# Patient Record
Sex: MALE | Race: WHITE | NOT HISPANIC OR LATINO | Employment: OTHER | URBAN - METROPOLITAN AREA
[De-identification: names, ages, dates, MRNs, and addresses within clinical notes are randomized per-mention and may not be internally consistent; named-entity substitution may affect disease eponyms.]

---

## 2017-01-06 ENCOUNTER — TELEPHONE (OUTPATIENT)
Dept: RHEUMATOLOGY | Facility: CLINIC | Age: 32
End: 2017-01-06

## 2017-01-09 ENCOUNTER — TELEPHONE (OUTPATIENT)
Dept: PAIN MEDICINE | Facility: CLINIC | Age: 32
End: 2017-01-09

## 2017-01-10 ENCOUNTER — PATIENT MESSAGE (OUTPATIENT)
Dept: PAIN MEDICINE | Facility: CLINIC | Age: 32
End: 2017-01-10

## 2017-01-10 ENCOUNTER — TELEPHONE (OUTPATIENT)
Dept: RHEUMATOLOGY | Facility: CLINIC | Age: 32
End: 2017-01-10

## 2017-01-10 NOTE — TELEPHONE ENCOUNTER
----- Message from Jemima Solo LPN sent at 12/27/2016  9:22 AM CST -----   Please review  ----- Message -----     From: Cortney Childress MD     Sent: 12/23/2016  12:16 PM       To: Juan Antonio Ramirez MA, Jemima Solo LPN    I received this message from [facility] about one of my patients, so it will be helpful to have him establish with Lonnie.    Thanks    Dr. Childress,  Could you help me arrange for this patient to see Dr Fleming when he returns from inpatient detox?  He will be in treatment until at least January; possibly further into the spring.  Wiley Mcfarland MD  Rheumatology

## 2017-01-10 NOTE — TELEPHONE ENCOUNTER
email :  Morning Dr. Mcfarland,    In order to keep you in the loop, I apologize for the delay in the information as I was out of the office over the past two days.   Polo HERNANDEZ has agreed to extend his treatment here 2 weeks, which projected discharge date is 1/24/17 and return to [place], [place].   We would need to reschedule the initial appointment that we had set for tomorrow 1/11 @ 3:30pm.     I will be in contact with the [facility] as well to reschedule his admission.     Thanks for all of your assistance and correspondence with karishma alvarez.       Gibson FLORIAN, CAAP, CRS  Addiction Counselor I  Primary [facility] Male Services  [facility]  Comprehensive Addiction [facility]. [facility].   [email or web address]  [phone], internal ext. [phone]

## 2017-01-25 ENCOUNTER — OFFICE VISIT (OUTPATIENT)
Dept: INTERNAL MEDICINE | Facility: CLINIC | Age: 32
End: 2017-01-25
Payer: COMMERCIAL

## 2017-01-25 ENCOUNTER — OFFICE VISIT (OUTPATIENT)
Dept: RHEUMATOLOGY | Facility: CLINIC | Age: 32
End: 2017-01-25
Payer: COMMERCIAL

## 2017-01-25 VITALS
SYSTOLIC BLOOD PRESSURE: 118 MMHG | DIASTOLIC BLOOD PRESSURE: 78 MMHG | BODY MASS INDEX: 28.52 KG/M2 | HEIGHT: 72 IN | WEIGHT: 210.56 LBS | OXYGEN SATURATION: 98 % | HEART RATE: 88 BPM

## 2017-01-25 VITALS
WEIGHT: 210 LBS | HEIGHT: 73 IN | BODY MASS INDEX: 27.83 KG/M2 | DIASTOLIC BLOOD PRESSURE: 80 MMHG | HEART RATE: 84 BPM | SYSTOLIC BLOOD PRESSURE: 127 MMHG

## 2017-01-25 DIAGNOSIS — M06.00 SERONEGATIVE RHEUMATOID ARTHRITIS: Primary | Chronic | ICD-10-CM

## 2017-01-25 DIAGNOSIS — F41.9 ANXIETY: Chronic | ICD-10-CM

## 2017-01-25 DIAGNOSIS — F11.21 OPIOID DEPENDENCE IN REMISSION: Chronic | ICD-10-CM

## 2017-01-25 DIAGNOSIS — I73.00 RAYNAUD'S PHENOMENON WITHOUT GANGRENE: ICD-10-CM

## 2017-01-25 DIAGNOSIS — F11.10 OPIOID ABUSE: ICD-10-CM

## 2017-01-25 DIAGNOSIS — R60.0 PERIPHERAL EDEMA: ICD-10-CM

## 2017-01-25 DIAGNOSIS — F98.8 ADD (ATTENTION DEFICIT DISORDER): Chronic | ICD-10-CM

## 2017-01-25 PROCEDURE — 99999 PR PBB SHADOW E&M-EST. PATIENT-LVL III: CPT | Mod: PBBFAC,,, | Performed by: INTERNAL MEDICINE

## 2017-01-25 PROCEDURE — 1159F MED LIST DOCD IN RCRD: CPT | Mod: S$GLB,,, | Performed by: INTERNAL MEDICINE

## 2017-01-25 PROCEDURE — 99214 OFFICE O/P EST MOD 30 MIN: CPT | Mod: S$GLB,,, | Performed by: INTERNAL MEDICINE

## 2017-01-25 PROCEDURE — 99204 OFFICE O/P NEW MOD 45 MIN: CPT | Mod: S$GLB,,, | Performed by: INTERNAL MEDICINE

## 2017-01-25 RX ORDER — BACLOFEN 10 MG/1
TABLET ORAL
COMMUNITY
Start: 2017-01-13 | End: 2017-01-25 | Stop reason: SDUPTHER

## 2017-01-25 RX ORDER — MIRTAZAPINE 15 MG/1
TABLET, FILM COATED ORAL
COMMUNITY
Start: 2017-01-21 | End: 2017-04-21

## 2017-01-25 RX ORDER — BACLOFEN 10 MG/1
10 TABLET ORAL 2 TIMES DAILY
Qty: 60 TABLET | Refills: 2 | Status: SHIPPED | OUTPATIENT
Start: 2017-01-25 | End: 2017-01-25 | Stop reason: SDUPTHER

## 2017-01-25 RX ORDER — GABAPENTIN 300 MG/1
300 CAPSULE ORAL 3 TIMES DAILY
Qty: 90 CAPSULE | Refills: 2 | Status: SHIPPED | OUTPATIENT
Start: 2017-01-25 | End: 2017-04-21

## 2017-01-25 RX ORDER — BACLOFEN 10 MG/1
10 TABLET ORAL 2 TIMES DAILY
Qty: 60 TABLET | Refills: 2 | Status: SHIPPED | OUTPATIENT
Start: 2017-01-25 | End: 2017-04-21

## 2017-01-25 RX ORDER — DEXTROAMPHETAMINE SACCHARATE, AMPHETAMINE ASPARTATE MONOHYDRATE, DEXTROAMPHETAMINE SULFATE AND AMPHETAMINE SULFATE 5; 5; 5; 5 MG/1; MG/1; MG/1; MG/1
20 CAPSULE, EXTENDED RELEASE ORAL EVERY MORNING
COMMUNITY
End: 2017-01-27 | Stop reason: ALTCHOICE

## 2017-01-25 RX ORDER — IBUPROFEN 200 MG
200 TABLET ORAL EVERY 6 HOURS PRN
COMMUNITY
End: 2017-01-25

## 2017-01-25 RX ORDER — ALPRAZOLAM 2 MG/1
TABLET ORAL
COMMUNITY
Start: 2016-11-11 | End: 2017-01-25

## 2017-01-25 RX ORDER — IBUPROFEN 600 MG/1
600 TABLET ORAL EVERY 6 HOURS PRN
Qty: 120 TABLET | Refills: 2 | Status: SHIPPED | OUTPATIENT
Start: 2017-01-25 | End: 2017-04-21

## 2017-01-25 RX ORDER — GABAPENTIN 300 MG/1
300 CAPSULE ORAL 3 TIMES DAILY
Qty: 90 CAPSULE | Refills: 2 | Status: SHIPPED | OUTPATIENT
Start: 2017-01-25 | End: 2017-01-25 | Stop reason: SDUPTHER

## 2017-01-25 ASSESSMENT — ROUTINE ASSESSMENT OF PATIENT INDEX DATA (RAPID3)
FATIGUE SCORE: 6.5
TOTAL RAPID3 SCORE: 5.61
PAIN SCORE: 6.5
WHEN YOU AWAKENED IN THE MORNING OVER THE LAST WEEK, PLEASE INDICATE THE AMOUNT OF TIME IT TAKES UNTIL YOU ARE AS LIMBER AS YOU WILL BE FOR THE DAY: 2 HOURS
MDHAQ FUNCTION SCORE: 1.3
PSYCHOLOGICAL DISTRESS SCORE: 7.7
PATIENT GLOBAL ASSESSMENT SCORE: 6
AM STIFFNESS SCORE: 1, YES

## 2017-01-25 NOTE — PROGRESS NOTES
Subjective:       Patient ID: Polo Kauffman is a 31 y.o. male.    Chief Complaint: Disease Management    HPI   Follow-up seronegative arthritis, rheumatoid versus psoriatic, since 2008  Previous treatments have included methotrexate, Enbrel (ineffective), Humira with suboptimal response.\  History of Remicade with partial response  2014 Orencia weekly subcutaneous injection with improvement    Had been on chronic opiates for pain management but he lost control and was hospitalized for seizures prompting subsequent detoxification.  He just returned from a six-week [facility] in [place] where he was completely weaned off of opiates.    He describes that he is now aware of the problems that were associated with his previous medication abuse.  He is grateful that his family and fiancée and supportive.  He notes that he saw other residents at the treatment center having seizures and that has reinforced his decision to stay off of opiates.    He has been taking Orencia 125 mg weekly  He has been taking ibuprofen 4 times daily as needed; he is not sure of the dose but says it is a large tablet  He has not been taking any opiates  He is now on both baclofen and gabapentin    Past medical history also includes ADHD and generalized anxiety disorder     He has had Raynaud's phenomena and especially when he was in [place].  No other new symptoms    His joint pains have been present in the hands and feet and in the lower back.  He's not had a lot of swelling.    Overall he feels far better than he did before his rehabilitation    Review of Systems   Constitutional: Negative for fatigue, fever and unexpected weight change.   HENT: Negative for mouth sores.    Respiratory: Negative for cough and shortness of breath.    Cardiovascular: Negative for chest pain.        He has not had recurrence of the edema that was present in November   Gastrointestinal: Negative for abdominal pain and diarrhea.   Genitourinary:  "Negative for dysuria.   Skin: Negative for rash.   Neurological: Negative for headaches.   Psychiatric/Behavioral: Negative for sleep disturbance.         Objective:     Visit Vitals    /80    Pulse 84    Ht 6' 1" (1.854 m)    Wt 95.3 kg (210 lb)    BMI 27.71 kg/m2        Physical Exam   Skin:     No psoriatic nail or skin changes are noted   Musculoskeletal:   There is palmar erythema but no Raynaud's  There is no swollen joints and good range of motion of the digits although he complains of tenderness on examination of the fourth and fifth digit bilaterally  Elbows and shoulders are normal  Neck full range of motion  Minimal tenderness in the lower spine where he says he has soreness  Lower extremities are normal except for tenderness reported on examination of the feet which are otherwise normal         November 27 CBC and renal function panel were normal  Assessment:       1. Seronegative rheumatoid arthritis    2. Opioid dependence in remission        He has no objective evidence of active synovitis though he does still have pain in the hands feet and lower back  He has now been weaned off of opiates    Plan:     1.  Continue Orencia 125 weekly  2.  Continue ibuprofen as an NSAID.  He can take 600 mg every 6 hours as needed.  This will enable some flexibility if he is able to reduce the dose  3.  I will refill  baclofen  but would like Dr. Vilchis's advice on its use in managing addiction disorder  4.  I will refill gabapentin  5.  We discussed possible occupational therapy as well as possible consultation of physical medicine (Dr. Colin or Steve) to help with his physical rehabilitation and pain management  6.  We'll plan follow-up laboratory studies.  If they are normal I will probably see him in 3 months        "

## 2017-01-25 NOTE — PROGRESS NOTES
Subjective:       Patient ID: Polo Kauffman is a 31 y.o. male.    Chief Complaint: Annual Exam    HPI Comments:   New pt.     Former pt of Dr Syed.      He reports he spent the past 9 weeks in an inpatient addiction program in [place].  He plans to start an IOP here.  He had been seeing Dr Gordon previously and plans to see Dr Vilchis moving forward.  His girlfriend committed suicide 7/4/2016 and pt reports he 'spiraled out of control' from that point.      He is feeling well today.  No c/o.  He is here to establish care.      PMH  -RA, started 12 yrs ago approx.  He follows w/Dr Mcfarland for this.          Review of Systems   Constitutional: Negative.    HENT: Negative.    Eyes: Negative.    Respiratory: Negative.    Cardiovascular: Negative.    Gastrointestinal: Negative.    Genitourinary: Negative.    Musculoskeletal: Negative.    Skin: Negative.    Neurological: Negative.    Psychiatric/Behavioral: Negative.        Past Medical History   Diagnosis Date    ADD (attention deficit disorder) 6/19/2015    Anxiety     Closed head injury with concussion 2014     Fell and hit head; workup for siezure negative    Depression     Raynaud's phenomenon 6/19/2015    Seronegative rheumatoid arthritis 6/19/2015     2008 polyarthralgia with sl swelling, neg lab MTX [id number] Prednisone [id number] Enbrel 2008-9 Humira 2009-10 Remicade 2010-14 Orencia 2014-present         History reviewed. No pertinent past surgical history.    Family History   Problem Relation Age of Onset    No Known Problems Mother     No Known Problems Father     No Known Problems Sister        Social History     Social History    Marital status: Single     Spouse name: N/A    Number of children: N/A    Years of education: N/A     Occupational History    [employer]      Social History Main Topics    Smoking status: Never Smoker    Smokeless tobacco: Never Used    Alcohol use No    Drug use: No      Comment: no longer using     Sexual activity: Not Currently     Other Topics Concern    None     Social History Narrative    Lives w/girlfriend.  No children.       Objective:       Vitals:    01/25/17 1129   BP: 118/78   Pulse: 88   SpO2: 98%   Weight: 95.5 kg (210 lb 8.6 oz)   Height: 6' (1.829 m)     Physical Exam   Constitutional: He is oriented to person, place, and time. He appears well-developed and well-nourished.   HENT:   Head: Normocephalic and atraumatic.   Right Ear: Tympanic membrane, external ear and ear canal normal.   Left Ear: Tympanic membrane, external ear and ear canal normal.   Mouth/Throat: Uvula is midline and oropharynx is clear and moist. No oropharyngeal exudate or posterior oropharyngeal erythema.   Eyes: Conjunctivae and EOM are normal. Pupils are equal, round, and reactive to light.   Neck: Normal range of motion. Neck supple. No thyromegaly present.   Cardiovascular: Normal rate, regular rhythm and normal heart sounds.  Exam reveals no gallop and no friction rub.    No murmur heard.  Pulmonary/Chest: Effort normal and breath sounds normal. He has no wheezes. He has no rhonchi. He has no rales.   Abdominal: Soft. Bowel sounds are normal. He exhibits no distension. There is no tenderness. There is no rebound and no guarding.   Musculoskeletal: Normal range of motion. He exhibits no edema or tenderness.   Lymphadenopathy:     He has no cervical adenopathy.   Neurological: He is alert and oriented to person, place, and time. He has normal strength and normal reflexes. No cranial nerve deficit or sensory deficit. He exhibits normal muscle tone. Coordination normal.   Skin: Skin is warm and dry. No rash noted.   Psychiatric: He has a normal mood and affect. His behavior is normal. Thought content normal.       Assessment:       1. Seronegative rheumatoid arthritis    2. Opioid abuse    3. Anxiety    4. Peripheral edema    5. ADD (attention deficit disorder)        Plan:           RA - Defer to Dr Mcfarland.      Opioid  abuse, anxiety - These appear to be pt's recent core problems and led him to a crisis point.  He just completed a 9-week inpatient treatment program and plans to start an IOP.  Defer to psychiatry.       Edema - May have been due to weight gain or steroid abuse or both.  This has now resolved.      ADD - Defer to psychiatry given his recent issues and numerous other psychiatric medications.

## 2017-01-25 NOTE — MR AVS SNAPSHOT
[facility] - Rheumatology  [address]  [place] [address]  Phone: [phone]  Fax: [phone]                  Polo Kauffman   2017 4:30 PM   Office Visit    Description:  Male : 1985   Provider:  Wiley Mcfarland MD   Department:  [facility] - Rheumatology           Reason for Visit     Disease Management           Diagnoses this Visit        Comments    Seronegative rheumatoid arthritis    -  Primary     Opioid dependence in remission                To Do List           Future Appointments        Provider Department Dept Phone    2017 8:00 AM [address] [facility] [phone]    2017 8:00 AM [address], [address] [facility] [phone]    2017 8:00 AM [address], [address] [facility] [phone]    2017 8:00 AM [address], [address] [facility] [phone]    2017 8:00 AM [address], [address] [facility] [phone]      Goals (5 Years of Data)     None       These Medications        Disp Refills Start End    ibuprofen (ADVIL,MOTRIN) 600 MG tablet 120 tablet 2 2017     Take 1 tablet (600 mg total) by mouth every 6 (six) hours as needed for Pain. - Oral    Pharmacy: [facility]/pharmacy #[id number] - [place], [place] - [address] Ph #: [phone]       abatacept (ORENCIA) 125 mg/mL Syrg 4 mL 5 2017     Inject 125 mg into the skin every 7 days. - Subcutaneous    Pharmacy: [facility], [place] - [address] [place] [address] Ph #: [phone]       gabapentin (NEURONTIN) 300 MG capsule 90 capsule 2 2017    Take 1 capsule (300 mg total) by mouth 3 (three) times daily. - Oral    Pharmacy: [facility]/pharmacy #[id number] - [place], [place] - [address] Ph #: [phone]       baclofen (LIORESAL) 10 MG tablet 60 tablet 2 2017     Take 1 tablet (10 mg total) by mouth 2 (two) times daily. - Oral    Pharmacy: [facility]/pharmacy #[id number]  - [place], [place] - [facility] [address]  #: [phone]         [facility] On Call     [facility] On Call Nurse Care Line - 24/7 Assistance  Registered nurses in the [facility] On Call Center provide clinical advisement, health education, appointment booking, and other advisory services.  Call for this free service at [phone].             Medications           Message regarding Medications     Verify the changes and/or additions to your medication regime listed below are the same as discussed with your clinician today.  If any of these changes or additions are incorrect, please notify your healthcare provider.        START taking these NEW medications        Refills    ibuprofen (ADVIL,MOTRIN) 600 MG tablet 2    Sig: Take 1 tablet (600 mg total) by mouth every 6 (six) hours as needed for Pain.    Class: Normal    Route: Oral      CHANGE how you are taking these medications     Start Taking Instead of    baclofen (LIORESAL) 10 MG tablet baclofen (LIORESAL) 10 MG tablet    Dosage:  Take 1 tablet (10 mg total) by mouth 2 (two) times daily.     Reason for Change:  Reorder       STOP taking these medications     meloxicam (MOBIC) 15 MG tablet Take 1 tablet (15 mg total) by mouth once daily.           Verify that the below list of medications is an accurate representation of the medications you are currently taking.  If none reported, the list may be blank. If incorrect, please contact your healthcare provider. Carry this list with you in case of emergency.           Current Medications     abatacept (ORENCIA) 125 mg/mL Syrg Inject 125 mg into the skin every 7 days.    baclofen (LIORESAL) 10 MG tablet Take 1 tablet (10 mg total) by mouth 2 (two) times daily.    calcium-vitamin D (OSCAL) 250 (625)-125 mg-unit per tablet Take 1 tablet by mouth once daily.    cloNIDine (CATAPRES) 0.1 MG tablet Take 1 tablet (0.1 mg total) by mouth every 4 (four) hours as needed (anxiety).    dextroamphetamine-amphetamine (ADDERALL XR)  "20 MG 24 hr capsule Take 20 mg by mouth every morning.    diazePAM (VALIUM) 5 MG tablet Take 1 tablet (5 mg total) by mouth 2 (two) times daily.    gabapentin (NEURONTIN) 300 MG capsule Take 1 capsule (300 mg total) by mouth 3 (three) times daily.    LACTOBAC CMB #3/FOS/PANTETHINE (PROBIOTIC & ACIDOPHILUS ORAL) Take by mouth.    mirtazapine (REMERON) 15 MG tablet     MULTIVIT-IRON-MIN-FOLIC ACID 3,500-18-0.4 UNIT-MG-MG ORAL CHEW Take by mouth.    quetiapine (SEROQUEL) 25 MG Tab Take 25 mg by mouth once daily.    acetaminophen (TYLENOL) 500 MG tablet Take 1 tablet (500 mg total) by mouth every 4 (four) hours as needed for Pain. Do not take more than 3000 mg (6 tablets) daily.    ibuprofen (ADVIL,MOTRIN) 600 MG tablet Take 1 tablet (600 mg total) by mouth every 6 (six) hours as needed for Pain.    sildenafil (VIAGRA) 100 MG tablet Take 1 tablet (100 mg total) by mouth daily as needed for Erectile Dysfunction. Take 1 hour before intercourse.           Clinical Reference Information           Vital Signs - Last Recorded  Most recent update: 1/25/2017  4:47 PM by Chloe Mason RN    BP Pulse Ht Wt BMI    127/80 84 6' 1" (1.854 m) 95.3 kg (210 lb) 27.71 kg/m2      Blood Pressure          Most Recent Value    BP  127/80      Allergies as of 1/25/2017     No Known Allergies      Immunizations Administered on Date of Encounter - 1/25/2017     None      Instructions    Tylenol (acetaminophen) is ok to take with ibuprofen if needed       "

## 2017-01-25 NOTE — MR AVS SNAPSHOT
[facility] - [facility]  [address]  [facility] [address]  Phone: [phone]  Fax: [phone]                  Polo Kauffman   2017 11:20 AM   Office Visit    Description:  Male : 1985   Provider:  Brian Steel MD   Department:  [facility] - Internal Medicine           Reason for Visit     Annual Exam           Diagnoses this Visit        Comments    Seronegative rheumatoid arthritis    -  Primary     Opioid abuse         Anxiety         Peripheral edema         ADD (attention deficit disorder)                To Do List           Future Appointments        Provider Department Dept Phone    2017 4:30 PM MD [facility] Rheumatology [phone]    2017 11:30 AM MD [facility] Rheumatology [phone]      Goals (5 Years of Data)     None      Follow-Up and Disposition     Return if symptoms worsen or fail to improve.      [facility] On Call     [facility] On Call Nurse [facility] -  Assistance  Registered nurses in the [facility] On Call Center provide clinical advisement, health education, appointment booking, and other advisory services.  Call for this free service at [phone].             Medications           Message regarding Medications     Verify the changes and/or additions to your medication regime listed below are the same as discussed with your clinician today.  If any of these changes or additions are incorrect, please notify your healthcare provider.        STOP taking these medications     alprazolam (XANAX) 2 MG Tab     furosemide (LASIX) 20 MG tablet Take 1 tablet (20 mg total) by mouth once daily.    hydrOXYzine HCl (ATARAX) 50 MG tablet Take 1 tablet (50 mg total) by mouth every 6 (six) hours as needed for Anxiety (agitation or insomnia).    ondansetron (ZOFRAN-ODT) 8 MG TbDL Take 1 tablet (8 mg total) by mouth every 8 (eight) hours as needed (nausea).    pantoprazole (PROTONIX) 20 MG tablet Take 1  tablet (20 mg total) by mouth once daily. Take when taking NSAIDS like Alleve or Ibuprofen or meloxicam.           Verify that the below list of medications is an accurate representation of the medications you are currently taking.  If none reported, the list may be blank. If incorrect, please contact your healthcare provider. Carry this list with you in case of emergency.           Current Medications     abatacept (ORENCIA) 125 mg/mL Syrg Inject 125 mg into the skin every 7 days.    acetaminophen (TYLENOL) 500 MG tablet Take 1 tablet (500 mg total) by mouth every 4 (four) hours as needed for Pain. Do not take more than 3000 mg (6 tablets) daily.    baclofen (LIORESAL) 10 MG tablet     calcium-vitamin D (OSCAL) 250 (625)-125 mg-unit per tablet Take 1 tablet by mouth once daily.    cloNIDine (CATAPRES) 0.1 MG tablet Take 1 tablet (0.1 mg total) by mouth every 4 (four) hours as needed (anxiety).    dextroamphetamine-amphetamine (ADDERALL XR) 20 MG 24 hr capsule Take 20 mg by mouth every morning.    diazePAM (VALIUM) 5 MG tablet Take 1 tablet (5 mg total) by mouth 2 (two) times daily.    gabapentin (NEURONTIN) 300 MG capsule Take 1 capsule (300 mg total) by mouth 3 (three) times daily.    LACTOBAC CMB #3/FOS/PANTETHINE (PROBIOTIC & ACIDOPHILUS ORAL) Take by mouth.    meloxicam (MOBIC) 15 MG tablet Take 1 tablet (15 mg total) by mouth once daily.    mirtazapine (REMERON) 15 MG tablet     MULTIVIT-IRON-MIN-FOLIC ACID 3,500-18-0.4 UNIT-MG-MG ORAL CHEW Take by mouth.    quetiapine (SEROQUEL) 25 MG Tab Take 25 mg by mouth once daily.    sildenafil (VIAGRA) 100 MG tablet Take 1 tablet (100 mg total) by mouth daily as needed for Erectile Dysfunction. Take 1 hour before intercourse.           Clinical Reference Information           Vital Signs - Last Recorded  Most recent update: 1/25/2017 11:39 AM by Radha Floyd MA    BP Pulse Ht Wt SpO2 BMI    118/78 88 6' (1.829 m) 95.5 kg (210 lb 8.6 oz) 98% 28.55 kg/m2      Blood  Pressure          Most Recent Value    BP  118/78      Allergies as of 1/25/2017     No Known Allergies      Immunizations Administered on Date of Encounter - 1/25/2017     None

## 2017-01-26 ENCOUNTER — HOSPITAL ENCOUNTER (OUTPATIENT)
Dept: PSYCHIATRY | Facility: HOSPITAL | Age: 32
Discharge: HOME | End: 2017-01-26
Attending: PSYCHIATRY & NEUROLOGY
Payer: COMMERCIAL

## 2017-01-26 VITALS
TEMPERATURE: 98 F | BODY MASS INDEX: 28.87 KG/M2 | SYSTOLIC BLOOD PRESSURE: 121 MMHG | WEIGHT: 213.13 LBS | DIASTOLIC BLOOD PRESSURE: 66 MMHG | HEART RATE: 103 BPM | RESPIRATION RATE: 16 BRPM | HEIGHT: 72 IN

## 2017-01-26 DIAGNOSIS — F41.9 ANXIETY: ICD-10-CM

## 2017-01-26 DIAGNOSIS — F98.8 ADD (ATTENTION DEFICIT DISORDER): ICD-10-CM

## 2017-01-26 DIAGNOSIS — F11.21 OPIOID DEPENDENCE IN REMISSION: Primary | Chronic | ICD-10-CM

## 2017-01-26 LAB
AMPHET+METHAMPHET UR QL: NEGATIVE
BARBITURATES UR QL SCN>200 NG/ML: NEGATIVE
BENZODIAZ UR QL SCN>200 NG/ML: NEGATIVE
BREATH ALCOHOL: 0
BZE UR QL SCN: NEGATIVE
CANNABINOIDS UR QL SCN: NEGATIVE
CREAT UR-MCNC: 262 MG/DL
ETHANOL UR-MCNC: <10 MG/DL
METHADONE UR QL SCN>300 NG/ML: NEGATIVE
OPIATES UR QL SCN: NEGATIVE
PCP UR QL SCN>25 NG/ML: NEGATIVE
TOXICOLOGY INFORMATION: NORMAL

## 2017-01-26 PROCEDURE — 80307 DRUG TEST PRSMV CHEM ANLYZR: CPT

## 2017-01-26 PROCEDURE — 90853 GROUP PSYCHOTHERAPY: CPT | Mod: 59,,, | Performed by: PSYCHOLOGIST

## 2017-01-26 PROCEDURE — 90853 GROUP PSYCHOTHERAPY: CPT | Performed by: SOCIAL WORKER

## 2017-01-26 PROCEDURE — 90853 GROUP PSYCHOTHERAPY: CPT

## 2017-01-26 PROCEDURE — 99223 1ST HOSP IP/OBS HIGH 75: CPT | Mod: ,,, | Performed by: PSYCHIATRY & NEUROLOGY

## 2017-01-26 NOTE — PROGRESS NOTES
"ABU Admission H&P    1/26/2017 2:24 PM  Polo Kauffman  1985  [id number]    STATUS:  Intensive Outpatient Program (IOP)    SUBJECTIVE:     History of Present Illness:    Mr. Kauffman is a 31 year old white male with PMH of seronegative rheumatoid arthritis with associated chronic pain and past psychiatric history of anxiety and Opiate Use Disorder who presented to the ABU today after finishing two months or rehab at [facility] in [facility]. He arrived here on Tuesday. I previously [place] 2016 [place] [facility] had a seizure from snorting methadone. He reported to me today that he has only done that on a handful of occasions, and has not ever really been able to understand why; he attributes it to his "addict mind". He left the next day to go to rehab. He is still with the same girlfriend living in [facility]. She and his family have been supportive. He found rehab to be very productive. He has been off of opiates since that time (which he was taking for chronic pain), but is still on benzodiazepines and Adderall. He is very hesitant to get off of Adderall but interested in discussing getting off the Valium he is on now. He said it helps him with his severe anxiety. He attributes a lot of his addict behavior to an event that occurred a few months before I saw him in which his then-girlfriend committed suicide. He said after that his opiate use (methadone) became a habit of abuse rather than used purely for chronic pain. He also saw another physician to increase his Xanax from 2 mg BID to 3 mg BID.    At that time, he was taking the following: Xanax 2 mg PO TWICE DAILY for anxiety, Adderall 20 mg PO qAM and 10 mg PO qMid-day and 10 mg PO qAfternoon, methadone 40 mg PO daily for chronic pain associated with RA, and Orencia injections for RA.    Home Medications:    1) Valium 10 mg PO BID for anxiety  2) Orentzia (managed by Rheumatology)  3) baclofen "   4) Adderall 20 mg PO qAM and 10 mg PO qLunch  5) gabapentin TID (dose unknown)  6) Zoloft 100 mg PO qd for depression and anxiety  7) clonidine 0.1 mg PO TID for anxiety    The following history is from my note on 11/26/2016 and has been reviewed with the patient and updated as needed.    Substance Abuse History:  Substance of Choice: opiate pills  Substances Used: marijuana, alcohol, opiates  History of IVDU?:  denied  Use of Alcohol: Yes, very mild current, heavy at one time  Tobacco: denied  History of Withdrawals: denied  History of Detox: denied  Rehab History:  Yes, just got out (see HPI)     Opiate Use Disorder Criteria:      A. A problematic pattern of substance use leading to clinically significant impairment or distress, as manifested by at least two of the following, occuring within a 12-month period:  1. Substance is often taken in larger amounts or over a longer period than was intended.   2. There is a persistent desire or unsuccessful efforts to cut down or control substance use.    3. A great deal of time is spent in activities necessary to obtain substance, use substance, or recover from its effects.  4. Craving, or a strong desire or urge to use substance.  5. Recurrent substance use resulting in a failure to fulfill major obligations at work, school, or home.  6. Continued substance use despite having persistent or recurrent social or interpersonal problems caused or exacerbated by the effects of substance.  7. Important social, occupational, or recreational activities are given up or reduced because of substance use.  8. Recurrent substance us in situations in which it is physically hazardous.  9. Substance use is continued despite knowledge of having a persistent or recurrent physical or psychological problems that is likely to have been caused or exacerbated by substance.  10. Tolerance, as defined by either of the following:  A. A need for markedly increased amounts of substance to achieve  intoxication or desired effect.    B. A markedly diminished effect with continued use of the same amount of substance.  11. Withdrawal, as manifested by the following:  A. The characteristic withdrawal syndrome for substance  B. Substance is taken to relieve or avoid withdrawal symptoms.           COMORBIDITIES:     Past Psychiatric History: yes  Diagnoses: NAHED, depression  Previous Medication Trials: Prozac, Ativan, Valium, Ritalin, Adderall, dexamphetamine    Previous Psychiatric Hospitalizations: denied  Previous Suicide Attempts: denied   History of Violence: denied   Outpatient Psychiatrist: yes, Dr. Mancini; saw last month    Patient aware of biomedical complications? yes        SOCIAL HISTORY:     Family Psychiatric History: denied  History of Abuse (whether as abuser or abused): unknown  Education: college  Special Ed: yes  Relational: girlfriend  Children: 0  Occupational: sales in oil [employer]  Legal: has past charges for MIP and DUI, both dropped per patient     Psychosocial Factors:  Maladaptive or problem behaviors: substance use  Peer group, social, ethic, cultural, emotional, and health factors: RA, recent suicide of girlfriend  Living situation, family constellation, family circumstances/[place]   Recovery environment: good  Community resources used by patient: yes  Treatment acceptance/motivation for change: unclear at this time        Medical History:  Past Medical History   Diagnosis Date    ADD (attention deficit disorder) 6/19/2015    Anxiety     Closed head injury with concussion 2014     Fell and hit head; workup for siezure negative    Depression     Raynaud's phenomenon 6/19/2015    Seronegative rheumatoid arthritis 6/19/2015     2008 polyarthralgia with sl swelling, neg lab MTX [id number] Prednisone [id number] Enbrel 2008-9 Humira 2009-10 Remicade 2010-14 Orencia 2014-present         Neurological History:  Seizures:  Yes, likely due to snorting methadone  Head Trauma:  " denied    Surgical History:  No past surgical history on file.    Allergies:  Review of patient's allergies indicates:  No Known Allergies          OBJECTIVE:     Vital Signs (Most Recent)  Vitals:    01/26/17 1055   BP: 121/66   Pulse: 103   Resp: 16   Temp: 98.2 °F (36.8 °C)       Psychiatric ROS:      Comprehensive psychiatric ROS negative other than per HPI and as follows:    +anxiety    Psychiatric Physical Exam:      General: resting in chair in NAD  HEENT: EOMI, benign OP  Respiratory: CTAB, unlabored breathing  Cardiovascular: RRR, no murmurs  Abdominal: abdomen soft, non-tender, non-distended  Extremities: no cyanosis or clubbing  Neurological: no involuntary movements observed, no focal neurological deficits      Mental Status Exam:    Appearance: young white male wearing casual clothes in NAD  Behavior: calm, cooperative  Speech: normal rate, tone, and volume  Mood: "ok"  Affect: somewhat restricted mobility  Thought Process: linear  Thought Perceptions: denied AVH  Thought Content: denied SI, HI; no delusions apparent  Sensorium: awake, alert  Attention/Concentration: intact to conversation  Orientation: person, place, time, and situation  Memory: intact (recent, remote)  Abstraction: intact   Insight: fair  Judgment: fair      Labs/Imaging/Studies:   Recent Results (from the past 24 hour(s))   POCT BREATH ALCOHOL TEST    Collection Time: 01/26/17 10:59 AM   Result Value Ref Range    Breath Alcohol 0.000           ASSESSMENT/PLAN:     Opiate Use Disorder, severe, in early remission  Unspecified Anxiety Disorder    Plan:    1.  Start patient on ABU protocol.  2.  Breathalyzer and Urine toxicology daily.  3.  VS daily x 3 days.  4. CBC, CMP already performed day before initiating program by rheumatologist; labs reviewed.  5.  Patient counseled on abstinence from all illicit substances, alcohol, prescription opiates.    -will discuss changing medications further as program progresses  -continue meds as " follows:    Home Medications:    1) Valium 10 mg PO BID for anxiety  2) Orentzia (managed by Rheumatology)  3) baclofen   4) Adderall 20 mg PO qAM and 10 mg PO qLunch  5) gabapentin TID (dose unknown)  6) Zoloft 100 mg PO qd for depression and anxiety  7) clonidine 0.1 mg PO TID for anxiety    Will discuss case with Dr. Doe.    Nicholas King IV, MD  PGY-2 Psychiatry, [facility]  01/26/2017 2:24 PM        Attending Attestation:    I have independently evaluated the patient and discussed the case with the resident. I have reviewed this note and agree with its contents, as well as the assessment and plan.     Karla Doe MD

## 2017-01-26 NOTE — PLAN OF CARE
01/26/17 1400   Activity/Group Therapy Checklist   Group Relapse Prevention  (Motivation for Recovery)   Attendance Attended   Follows Direction Followed directions   Group Interactions/Observations Interacted appropriately   Affect/Mood Range Normal range   Affect/Mood Display Appropriate   Goal Progression Progressing

## 2017-01-26 NOTE — PROGRESS NOTES
Group Psychotherapy (PhD/LCSW)    Site: [facility]    Clinical status of patient: Intensive Outpatient Program (IOP)    Date: 1/26/2017    Group Focus: Psychodynamic Group Psychotherapy    Length of service: 51357 - 45-50 minutes    Number of patients in attendance: 10    Referred by: Addictive Behavior Unit Treatment Team    Target symptoms: Substance Abuse    Patient's response to treatment: Active Listening and Self-disclosure    Progress toward goals: Progressing adequately    Interval History: Shared his story with the group.    Diagnosis: Opiate use disorder, severe, in early remission    Plan: Continue treatment on ABU

## 2017-01-26 NOTE — PROGRESS NOTES
Group Psychotherapy (PhD/LCSW)    Site: [facility]    Clinical status of patient: Intensive Outpatient Program (IOP)    Date: 1/26/2017    Group Focus: Stress Management    Length of service: 12338 - 45-50 minutes    Number of patients in attendance: 15    Referred by: Addictive Behavior Unit Treatment Team    Target symptoms: Substance Abuse    Patient's response to treatment: Active Listening and Self-disclosure    Progress toward goals: Progressing adequately    Interval History: Discussed how embracing false assumptions can contribute to stress.    Diagnosis: Opiate use disorder, severe, in early remission    Plan: Continue treatment on ABU

## 2017-01-27 ENCOUNTER — HOSPITAL ENCOUNTER (OUTPATIENT)
Dept: PSYCHIATRY | Facility: HOSPITAL | Age: 32
Discharge: HOME | End: 2017-01-27
Attending: PSYCHIATRY & NEUROLOGY
Payer: COMMERCIAL

## 2017-01-27 VITALS — DIASTOLIC BLOOD PRESSURE: 65 MMHG | RESPIRATION RATE: 16 BRPM | SYSTOLIC BLOOD PRESSURE: 126 MMHG | HEART RATE: 53 BPM

## 2017-01-27 DIAGNOSIS — F41.9 ANXIETY: ICD-10-CM

## 2017-01-27 DIAGNOSIS — F98.8 ADD (ATTENTION DEFICIT DISORDER): ICD-10-CM

## 2017-01-27 DIAGNOSIS — F11.21 OPIOID DEPENDENCE IN REMISSION: Primary | ICD-10-CM

## 2017-01-27 LAB
AMPHET+METHAMPHET UR QL: NEGATIVE
BARBITURATES UR QL SCN>200 NG/ML: NEGATIVE
BENZODIAZ UR QL SCN>200 NG/ML: NEGATIVE
BREATH ALCOHOL: 0
BZE UR QL SCN: NEGATIVE
CANNABINOIDS UR QL SCN: NEGATIVE
CREAT UR-MCNC: 120 MG/DL
ETHANOL UR-MCNC: <10 MG/DL
METHADONE UR QL SCN>300 NG/ML: NEGATIVE
OPIATES UR QL SCN: NEGATIVE
PCP UR QL SCN>25 NG/ML: NEGATIVE
TOXICOLOGY INFORMATION: NORMAL

## 2017-01-27 PROCEDURE — 80307 DRUG TEST PRSMV CHEM ANLYZR: CPT

## 2017-01-27 PROCEDURE — 90853 GROUP PSYCHOTHERAPY: CPT | Performed by: SOCIAL WORKER

## 2017-01-27 PROCEDURE — 90853 GROUP PSYCHOTHERAPY: CPT | Mod: 59,,, | Performed by: PSYCHOLOGIST

## 2017-01-27 PROCEDURE — 90853 GROUP PSYCHOTHERAPY: CPT | Mod: ,,, | Performed by: PSYCHOLOGIST

## 2017-01-27 PROCEDURE — 90853 GROUP PSYCHOTHERAPY: CPT

## 2017-01-27 PROCEDURE — 99233 SBSQ HOSP IP/OBS HIGH 50: CPT | Mod: ,,, | Performed by: PSYCHIATRY & NEUROLOGY

## 2017-01-27 RX ORDER — LISDEXAMFETAMINE DIMESYLATE 50 MG/1
50 CAPSULE ORAL EVERY MORNING
Qty: 30 CAPSULE | Refills: 0 | Status: SHIPPED | OUTPATIENT
Start: 2017-01-27 | End: 2017-02-06

## 2017-01-27 NOTE — PROGRESS NOTES
Group Psychotherapy (PhD/LCSW)    Site: [facility]    Clinical status of patient: Intensive Outpatient Program (IOP)    Date: 1/27/2017    Group Focus: Stress Management    Length of service: 04997 - 45-50 minutes    Number of patients in attendance: 13    Referred by: Addictive Behavior Unit Treatment Team    Target symptoms: Substance Abuse    Patient's response to treatment: Active Listening    Progress toward goals: Progressing adequately    Interval History: Group learned mindfulness techniques (breathing) to improve present-moment awareness, impulsive behavior tendencies, and tolerance of various emotional states.    Diagnosis: Opioid Use Disorder in early remission    Plan: Continue treatment on ABU

## 2017-01-27 NOTE — TREATMENT PLAN
[facility]  ADDICTIVE BEHAVIOR UNIT  INTERDISCIPLINARY TREATMENT PLAN  INTENSIVE OUTPATIENT PROGRAM    INTERDISCIPLINARY  TREATMENT TEAM:    Dom Vilchis M.D., Psychiatrist     Karla Doe M.D., Psychiatrist     Michelle Cool, Ph.D., Clinical Psychologist    Aubrie Klein, Ph.D., Clinical Psychologist    Stewart Davalos,  Ph.D., Clinical Psychologist    Tracey Martinez R.N., Registered Nurse    Kofi Kearns, [facility],     Jose Bray, [facility],     Donato Joshi, [facility],     Resident:  Nicholas King M.D.      Signatures scanned into record separately.      ESTIMATED LOS:  4-6 weeks        The patient has reviewed the treatment plan with staff and has signed the Patient Responsibilities form.  Patient signature scanned into record separately        Dr. Dom Vilchis certifies that the patient would require inpatient psychiatric care if the Partial Hospitalization services were not provided, and services will be furnished under the care of a physician, and under a written Plan of Treatment.    Dom Vilchis M.D., Psychiatrist - Signature scanned into record separately.    TREATMENT PLAN    DIAGNOSIS:      Patient/Family Education Needs/Barriers to Learning (i.e., Language, Reading, Comprehension): none      Support/Advocacy Services/Needs (i.e., Financial, Transportation, Medications): none      Community Resources (i.e., Alcoholics Anonymous, Al Anon, Cocaine Anonymous, Narcotics Anonymous): none          Strengths:    1. A loyal friend  2. Good listener  3. Participates well in small groups  4. Motivated for sobriety        Limitations:  1. Chronic pain  2. Stressful family dynamics  3. Work stress  4. Bad weather as an impediment to recovery follow up activities          Goals and Objectives:  1. Goal:  Abstain from alcohol and illicit drugs   Objective measure: Negative breathalyzer, negative urine screens   Time frame to reach goal: By  discharge    2  Goal: Attend daily 12-step meetings   Objective measure: Signed attendance sheet daily   Time frame to reach goal: Each day    3. Goal: Participate in group sessions    Objective measure: Progress notes indicating active listening, self-disclosure,   feedback   Time frame to reach goal: Each day    4. Goal: Obtain a 12-step sponsor   Objective measure: self-report   Time frame to reach goal: By discharge    5. Goal: Complete Life Story   Objective measure: Share story with group   Time frame to reach goal: Within first two weeks of treatment    6. Goal: Complete First Step   Objective measure: Share 1st step with group   Time frame to reach goal:  By discharge    7. Goal: Complete Relapse Prevention Plan   Objective measure: Share plan with group   Time frame to reach goal: By discharge    8. Goal: Family involvement/participation   Objective measure: Family session documented in progress notes   Time frame to reach goal: By discharge    9.  Goal: Reduce anxiety   Objective measure: Physician progress note indicating anxiety is improved   Time frame to reach goal: By discharge                Group Interventions:  Psychodynamic Group Psychotherapy  1 hour, five times per week  Goals: 1. Utilize group empathy and support for problem solving; 2. Apply stress management, communication, and assertive skills to personal issues; 3. Discuss negative consequences of addictive behavior; 4. Discuss ways to change lifestyle to support sobriety; 5. Discuss addiction history    Addiction Education Group  1 hour, 2 times per week  Goals:  1. Verbalize increased knowledge of the process of recovery; 2. Understand basic concepts of addiction (denial, powerlessness, unmanageabiltiy, etc.); 3. Develop a consistent, positive image of self    Steps to Recovery Group  1 hour, 1 time per week  Goals:  1. Learn 12 steps; 2. Identify ways to incorporate 12 step principles into daily life; 3. Complete first step; 4. Verbalize  knowledge and understanding of the concept of a higher power    Living Sober Group  1 hour, 2 times per week  Goals:  1.  Reflect upon events of day/weekend, focusing on positive change; 2.  Discuss dynamics of 12 step meetings attended; 3. Discuss topics from book Living Sober     Stress Management Skills Group  1 hour, 3 times per week  Goals: 1. Identify types and levels of stress; 2. Identify and change maladaptive beliefs and behaviors; 3. Identify and practice techniques of stress management    Disease Concept Group  1 hour, 1 time per week  Goals: 1. Verbalize an understanding of the disease concept of addiction; 2. Increase familys understanding of the disease concept of addiction    Communication Skills Group  1 hour, 2 times per week  Goals: 1. Learn rules of effective communication; 2. Improve listening skills; 3. Practice clear communication    Promoting Healthy Lifestyles Group  1 hour, 1 time per week  Goals:  1. Understand the biopsychosocial model of health; 2. Develop insight into how substance abuse/dependency can impact dimensions of health; 3. Develop appropriate health promotion strategies    Relationship Dynamics Group  1 hour, 1 time per week  Goals:  1. Learn about factors that shape relationships; 2. Understand the central role of relationships in personal well-being; 3. Learn how to improve all relationships    Medical Complications Group  1 hour, 1 time per week  Goals:  1.  Increase knowledge of how addiction negatively affects the body; 2. Increase awareness of how abstinence can positively impact health

## 2017-01-27 NOTE — PROGRESS NOTES
PSYCHIATRY  ABU Partial Hospitalization  PROGRESS NOTE    Patient Name: Polo Kauffman  2017  1:10 PM  Start Date: 2017  : 1985    Status: Intensive Outpatient Program (IOP)    SUBJECTIVE:    Mr. Castle said he is feeling well today. Has not taken Valium in a few days. No cravings since he received the Vivitrol injection before leaving [facility]. We discussed the Adderall situation. He is very firm that he remain on Adderall. We offered Vyvanse as it is less abusable and more appropriate for individuals with addiction issues. He remained outwardly calm, but appeared to be having to contain some significant anger at being challenged on the Adderall. He firmly asked in a demanding tone that prescribing Vyvanse come with the condition that the Adderall issue can be revisited if the Vyvanse does not work.    Medication Side Effects: denied  Cravings: denied  No other acute psychiatric issues reported at this time.    Scheduled Meds:   Current Outpatient Prescriptions on File Prior to Encounter   Medication Sig Dispense Refill    abatacept (ORENCIA) 125 mg/mL Syrg Inject 125 mg into the skin every 7 days. 4 mL 5    acetaminophen (TYLENOL) 500 MG tablet Take 1 tablet (500 mg total) by mouth every 4 (four) hours as needed for Pain. Do not take more than 3000 mg (6 tablets) daily.  0    baclofen (LIORESAL) 10 MG tablet Take 1 tablet (10 mg total) by mouth 2 (two) times daily. 60 tablet 2    calcium-vitamin D (OSCAL) 250 (625)-125 mg-unit per tablet Take 1 tablet by mouth once daily.      cloNIDine (CATAPRES) 0.1 MG tablet Take 1 tablet (0.1 mg total) by mouth every 4 (four) hours as needed (anxiety). 88 tablet 0    diazePAM (VALIUM) 5 MG tablet Take 1 tablet (5 mg total) by mouth 2 (two) times daily. 28 tablet 0    gabapentin (NEURONTIN) 300 MG capsule Take 1 capsule (300 mg total) by mouth 3 (three) times daily. 90 capsule 2    ibuprofen (ADVIL,MOTRIN) 600 MG tablet Take 1 tablet (600 mg  total) by mouth every 6 (six) hours as needed for Pain. 120 tablet 2    LACTOBAC CMB #3/FOS/PANTETHINE (PROBIOTIC & ACIDOPHILUS ORAL) Take by mouth.      mirtazapine (REMERON) 15 MG tablet       MULTIVIT-IRON-MIN-FOLIC ACID 3,500-18-0.4 UNIT-MG-MG ORAL CHEW Take by mouth.      quetiapine (SEROQUEL) 25 MG Tab Take 25 mg by mouth once daily.      sildenafil (VIAGRA) 100 MG tablet Take 1 tablet (100 mg total) by mouth daily as needed for Erectile Dysfunction. Take 1 hour before intercourse. 10 tablet 11    [DISCONTINUED] dextroamphetamine-amphetamine (ADDERALL XR) 20 MG 24 hr capsule Take 20 mg by mouth every morning.       No current facility-administered medications on file prior to encounter.          ALLERGIES:  Review of patient's allergies indicates:  No Known Allergies      Psychiatric Review Of Systems - Is patient experiencing or having changes in:  sleep: denied  appetite: denied  weight: denied  energy/anergy: denied  interest/pleasure/anhedonia: denied  somatic symptoms: denied  libido: denied  anxiety/panic: denied  guilty/hopelessness: denied  concentration: denied  S.I.B.s/risky behavior: denied  Irritability: denied  Racing thoughts: denied  Impulsive behaviors: denied  Paranoia: denied  AVH: denied    Medical ROS:  See Dr. Vilchis's Admission Note of date 1/26/2017 for ROS.     OBJECTIVE:    Vital Signs (Most Recent)  Vitals:    01/27/17 1149   BP: 126/65   Pulse: (!) 53   Resp: 16       Mental Status Exam:    Appearance: young white male wearing casual clothes in NAD  Behavior: calm, cooperative; vaguely hostile  Speech: normal rate, tone, and volume  Mood: neutral  Affect: full range, but very restricted mobility  Thought Process: linear  Thought Perceptions: denied AVH  Thought Content: denied SI, HI; no delusions apparent  Sensorium: awake, alert  Attention/Concentration: intact to conversation  Orientation: person, place, time, and situation  Memory: intact (recent, remote)  Abstraction:  intact   Insight: fair  Judgment: fair       Laboratory:  Recent Results (from the past 48 hour(s))   Toxicology screen, urine    Collection Time: 01/26/17 10:59 AM   Result Value Ref Range    Alcohol, Urine <10 <10 mg/dL    Benzodiazepines Negative     Methadone metabolites Negative     Cocaine (Metab.) Negative     Opiate Scrn, Ur Negative     Barbiturate Screen, Ur Negative     Amphetamine Screen, Ur Negative     THC Negative     Phencyclidine Negative     Creatinine, Random Ur 262.0 23.0 - 375.0 mg/dL    Toxicology Information SEE COMMENT    POCT BREATH ALCOHOL TEST    Collection Time: 01/26/17 10:59 AM   Result Value Ref Range    Breath Alcohol 0.000    POCT BREATH ALCOHOL TEST    Collection Time: 01/27/17 11:50 AM   Result Value Ref Range    Breath Alcohol 0.000      ASSESSMENT/PLAN:    Opiate Use Disorder, severe, in early remission  Unspecified Anxiety Disorder    History of ADHD    Status:  Continue treatment on ABU    Plan:     1) Vital signs 3x a week  2) Daily breathalyzer and urine tox screen  3) ABU protocol    Patient's Intervention Response: accepting, reluctant    Medications:       -patient completed Valium a few days ago, feeling well  -will change Adderall to Vyvanse 50 mg PO qd for inattentive symptoms of ADHD  -encountered significant resistance to the change of Adderall  -continue gabapentin TID (dose unknown)  -continue Zoloft 100 mg PO qd for depression and anxiety  -continue clonidine 0.1 mg PO TID for anxiety  -patient also received Vivitrol injection before leaving [facility], will investigate further    Additional Comments: none    Patient seen and discussed with Dr. Doe.    Nicholas King IV, MD  PGY-2 Psychiatry, [facility]  01/27/2017 1:20 PM        Attending Attestation:    I have independently evaluated the patient and discussed the case with the resident. I have reviewed this note and agree with its contents, as well as the assessment and plan.     Karla Doe MD

## 2017-01-27 NOTE — PLAN OF CARE
01/27/17 1000   Activity/Group Therapy Checklist   Group Addiction Education  (Life Story Assignment Discussion; Peer presented relapse prevention plan; feedback given.)   Attendance Attended   Follows Direction Followed directions   Group Interactions/Observations Interacted appropriately;Supportive   Affect/Mood Range Normal range   Affect/Mood Display Appropriate   Goal Progression Progressing

## 2017-01-27 NOTE — PROGRESS NOTES
Group Psychotherapy (PhD/LCSW)    Site: [facility]    Clinical status of patient: Intensive Outpatient Program (IOP)    Date: 1/27/2017    Group Focus: Psychodynamic Group Psychotherapy    Length of service: 31554 - 45-50 minutes    Number of patients in attendance: 8    Referred by: Addictive Behavior Unit Treatment Team    Target symptoms: Substance Abuse    Patient's response to treatment: Active Listening and Self-disclosure    Progress toward goals: Progressing adequately    Interval History: Discussed girlfriend saying he needs a therapy dog.    Diagnosis: Opiate use disorder, severe, in early remission    Plan: Continue treatment on ABU

## 2017-01-30 ENCOUNTER — HOSPITAL ENCOUNTER (OUTPATIENT)
Dept: PSYCHIATRY | Facility: HOSPITAL | Age: 32
Discharge: HOME | End: 2017-01-30
Attending: PSYCHIATRY & NEUROLOGY
Payer: COMMERCIAL

## 2017-01-30 VITALS — SYSTOLIC BLOOD PRESSURE: 152 MMHG | HEART RATE: 115 BPM | RESPIRATION RATE: 18 BRPM | DIASTOLIC BLOOD PRESSURE: 90 MMHG

## 2017-01-30 DIAGNOSIS — F41.9 ANXIETY: ICD-10-CM

## 2017-01-30 DIAGNOSIS — F98.8 ADD (ATTENTION DEFICIT DISORDER): ICD-10-CM

## 2017-01-30 DIAGNOSIS — F11.21 OPIOID DEPENDENCE IN REMISSION: Primary | ICD-10-CM

## 2017-01-30 LAB
AMPHET+METHAMPHET UR QL: NORMAL
BARBITURATES UR QL SCN>200 NG/ML: NEGATIVE
BENZODIAZ UR QL SCN>200 NG/ML: NEGATIVE
BREATH ALCOHOL: 0
BREATH ALCOHOL: 0
BZE UR QL SCN: NEGATIVE
CANNABINOIDS UR QL SCN: NEGATIVE
CREAT UR-MCNC: 107 MG/DL
ETHANOL UR-MCNC: <10 MG/DL
METHADONE UR QL SCN>300 NG/ML: NEGATIVE
OPIATES UR QL SCN: NEGATIVE
PCP UR QL SCN>25 NG/ML: NEGATIVE
TOXICOLOGY INFORMATION: NORMAL

## 2017-01-30 PROCEDURE — 90853 GROUP PSYCHOTHERAPY: CPT | Performed by: SOCIAL WORKER

## 2017-01-30 PROCEDURE — 90853 GROUP PSYCHOTHERAPY: CPT

## 2017-01-30 PROCEDURE — 90853 GROUP PSYCHOTHERAPY: CPT | Mod: ,,, | Performed by: PSYCHOLOGIST

## 2017-01-30 PROCEDURE — 80307 DRUG TEST PRSMV CHEM ANLYZR: CPT

## 2017-01-30 PROCEDURE — 90853 GROUP PSYCHOTHERAPY: CPT | Mod: 59,,, | Performed by: PSYCHOLOGIST

## 2017-01-30 NOTE — PROGRESS NOTES
Group Psychotherapy (PhD/LCSW)    Site: [facility]    Clinical status of patient: Intensive Outpatient Program (IOP)    Date: 1/30/2017    Group Focus: Psychodynamic Group Psychotherapy    Length of service: 48675 - 45-50 minutes    Number of patients in attendance: 10    Referred by: Addictive Behavior Unit Treatment Team    Target symptoms: Substance Abuse    Patient's response to treatment: Active Listening and Self-disclosure    Progress toward goals: Progressing adequately    Interval History: Shared his story with new group mmebers.    Diagnosis: Opiate use disorder, severe, in early remission    Plan: Continue treatment on ABU

## 2017-01-30 NOTE — PROGRESS NOTES
"PSYCHIATRY  ABU Partial Hospitalization  PROGRESS NOTE    Patient Name: Polo Kauffman  2017  11:27 AM  Start Date: 2017  : 1985    Status: Intensive Outpatient Program (IOP)    SUBJECTIVE:    Mr. Castle said his weekend went very well. He and his girlfriend got a "rehab puppy", which is a victor doodle. He is excited about the dog. Working on his relationship with his parents. Has been enjoying his [employer] meetings. Enjoys meeting with professionals rather than farmers as he was in [place]. 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 Has many financial stressors, and not being able to work stresses him out. Discussed issues with his father controlling his life. He said the Vyvanse is working reasonably well, and denied side effects. He has no issues with it currently and is not argumentative about it.     Medication Side Effects: denied  Cravings: denied  No other acute psychiatric issues reported at this time.    Scheduled Meds:   Current Outpatient Prescriptions on File Prior to Encounter   Medication Sig Dispense Refill    abatacept (ORENCIA) 125 mg/mL Syrg Inject 125 mg into the skin every 7 days. 4 mL 5    acetaminophen (TYLENOL) 500 MG tablet Take 1 tablet (500 mg total) by mouth every 4 (four) hours as needed for Pain. Do not take more than 3000 mg (6 tablets) daily.  0    baclofen (LIORESAL) 10 MG tablet Take 1 tablet (10 mg total) by mouth 2 (two) times daily. 60 tablet 2    calcium-vitamin D (OSCAL) 250 (625)-125 mg-unit per tablet Take 1 tablet by mouth once daily.      cloNIDine (CATAPRES) 0.1 MG tablet Take 1 tablet (0.1 mg total) by mouth every 4 (four) hours as needed (anxiety). 88 tablet 0    diazePAM (VALIUM) 5 MG tablet Take 1 tablet (5 mg total) by mouth 2 (two) times daily. 28 tablet 0    gabapentin (NEURONTIN) 300 MG capsule Take 1 capsule (300 mg total) by mouth 3 (three) times daily. 90 capsule 2    ibuprofen (ADVIL,MOTRIN) 600 MG tablet Take 1 tablet " (600 mg total) by mouth every 6 (six) hours as needed for Pain. 120 tablet 2    LACTOBAC CMB #3/FOS/PANTETHINE (PROBIOTIC & ACIDOPHILUS ORAL) Take by mouth.      lisdexamfetamine (VYVANSE) 50 MG capsule Take 1 capsule (50 mg total) by mouth every morning. 30 capsule 0    mirtazapine (REMERON) 15 MG tablet       MULTIVIT-IRON-MIN-FOLIC ACID 3,500-18-0.4 UNIT-MG-MG ORAL CHEW Take by mouth.      quetiapine (SEROQUEL) 25 MG Tab Take 25 mg by mouth once daily.      sildenafil (VIAGRA) 100 MG tablet Take 1 tablet (100 mg total) by mouth daily as needed for Erectile Dysfunction. Take 1 hour before intercourse. 10 tablet 11     No current facility-administered medications on file prior to encounter.          ALLERGIES:  Review of patient's allergies indicates:  No Known Allergies      Psychiatric Review Of Systems - Is patient experiencing or having changes in:  sleep: denied  appetite: denied  weight: denied  energy/anergy: denied  interest/pleasure/anhedonia: denied  somatic symptoms: denied  libido: denied  anxiety/panic: denied  guilty/hopelessness: denied  concentration: denied  S.I.B.s/risky behavior: denied  Irritability: denied  Racing thoughts: denied  Impulsive behaviors: denied  Paranoia: denied  AVH: denied    Medical ROS:  See Dr. Vilchis's Admission Note of date 1/26/2017 for ROS.     OBJECTIVE:    Vital Signs (Most Recent)  There were no vitals filed for this visit.    Mental Status Exam:    Appearance: young white male wearing casual clothes in NAD  Behavior: calm, cooperative; vaguely hostile  Speech: normal rate, tone, and volume  Mood: neutral  Affect: full range, with restricted mobility (mobility improving)  Thought Process: linear  Thought Perceptions: denied AVH  Thought Content: denied SI, HI; no delusions apparent  Sensorium: awake, alert  Attention/Concentration: intact to conversation  Orientation: person, place, time, and situation  Memory: intact (recent, remote)  Abstraction: intact    Insight: fair  Judgment: fair       Laboratory:  No results found for this or any previous visit (from the past 48 hour(s)).     ASSESSMENT/PLAN:    Opiate Use Disorder, severe  Unspecified Anxiety Disorder    History of ADHD    Status:  Continue treatment on ABU    Plan:     1) Vital signs 3x a week  2) Daily breathalyzer and urine tox screen  3) ABU protocol    Patient's Intervention Response: accepting, reluctant    Medications:       -continue Vyvanse 50 mg PO qd for inattentive symptoms of ADHD  -on gabapentin 300 mg PO BID; will increase gabapentin to 600 mg PO BID for anxiety and for rheumatological purposes   -increase Zoloft from 100 mg PO qd to 150 mg PO qd for anxiety and depression  -continue clonidine 0.1 mg PO TID for anxiety; instructed to taper off (take twice daily tomorrow and then once the next day)  -patient also received Vivitrol injection before leaving [facility], will investigate further    Additional Comments: Supportive Psychotherapy x 10 minutes.    Patient seen and discussed with Dr. Doe.    Nicholas King IV, MD  PGY-2 Psychiatry, [facility]  01/30/2017 11:50 AM

## 2017-01-30 NOTE — TREATMENT PLAN
Patient placed on alert for missed meeting Friday 1/27/17.  Spoke individually with patient, provided him with printed list of residential treatment programs; patient was given written questionnaire to complete and return to staff; he stated understanding. Attended an extra meeting/makeup meeting over the weekend.

## 2017-01-30 NOTE — PLAN OF CARE
01/27/17 1400   Activity/Group Therapy Checklist   Group Relapse Prevention   Attendance Attended   Follows Direction Followed directions   Group Interactions/Observations Interacted appropriately   Affect/Mood Range Normal range   Affect/Mood Display Appropriate   Goal Progression Progressing

## 2017-01-30 NOTE — PROGRESS NOTES
Group Psychotherapy (PhD/LCSW)    Site: [facility]    Clinical status of patient: Intensive Outpatient Program (IOP)    Date: 1/30/2017    Group Focus: Communication Skills       Length of service: 77895 - 45-50 minutes    Number of patients in attendance: 14    Referred by: Addictive Behavior Unit Treatment Team    Target symptoms: Substance Abuse    Patient's response to treatment: Active Listening      Progress toward goals: Progressing adequately    Interval History: Discussed the way differences in gender, fx of origin experiences, and ethnicity can hamper the communication process when they are not taken into account. Illustrated how the use of I-messages and Reflective Listening skills can help overcome these difficulties and enhance the communication process.    Diagnosis: Opiate use disorder, severe, in early remission    Plan: Continue treatment on ABU

## 2017-01-30 NOTE — PLAN OF CARE
01/30/17 1000   Activity/Group Therapy Checklist   Group Addiction Education  (Peer presented life story; feedback given.)   Attendance Attended   Follows Direction Followed directions   Group Interactions/Observations Interacted appropriately;Supportive   Affect/Mood Range Normal range   Affect/Mood Display Appropriate   Goal Progression Progressing

## 2017-01-31 ENCOUNTER — HOSPITAL ENCOUNTER (OUTPATIENT)
Dept: PSYCHIATRY | Facility: HOSPITAL | Age: 32
Discharge: HOME | End: 2017-01-31
Attending: PSYCHIATRY & NEUROLOGY
Payer: COMMERCIAL

## 2017-01-31 DIAGNOSIS — F11.21 OPIOID DEPENDENCE IN REMISSION: Primary | ICD-10-CM

## 2017-01-31 DIAGNOSIS — F98.8 ADD (ATTENTION DEFICIT DISORDER): ICD-10-CM

## 2017-01-31 DIAGNOSIS — F41.9 ANXIETY: ICD-10-CM

## 2017-01-31 LAB
AMPHET+METHAMPHET UR QL: NORMAL
BARBITURATES UR QL SCN>200 NG/ML: NEGATIVE
BENZODIAZ UR QL SCN>200 NG/ML: NEGATIVE
BREATH ALCOHOL: 0
BZE UR QL SCN: NEGATIVE
CANNABINOIDS UR QL SCN: NEGATIVE
CREAT UR-MCNC: 105 MG/DL
ETHANOL UR-MCNC: <10 MG/DL
METHADONE UR QL SCN>300 NG/ML: NEGATIVE
OPIATES UR QL SCN: NEGATIVE
PCP UR QL SCN>25 NG/ML: NEGATIVE
TOXICOLOGY INFORMATION: NORMAL

## 2017-01-31 PROCEDURE — 90853 GROUP PSYCHOTHERAPY: CPT | Mod: 59,,, | Performed by: PSYCHOLOGIST

## 2017-01-31 PROCEDURE — 80307 DRUG TEST PRSMV CHEM ANLYZR: CPT

## 2017-01-31 PROCEDURE — 90853 GROUP PSYCHOTHERAPY: CPT | Performed by: SOCIAL WORKER

## 2017-01-31 PROCEDURE — 90834 PSYTX W PT 45 MINUTES: CPT | Mod: ,,, | Performed by: PSYCHOLOGIST

## 2017-01-31 PROCEDURE — 90853 GROUP PSYCHOTHERAPY: CPT | Mod: ,,, | Performed by: PSYCHOLOGIST

## 2017-01-31 PROCEDURE — 90853 GROUP PSYCHOTHERAPY: CPT

## 2017-01-31 NOTE — PROGRESS NOTES
"Individual Psychotherapy (PhD/LCSW)    1/31/2017    Site:  [facility]         Therapeutic Intervention: Met with patient.  Outpatient - Insight oriented psychotherapy 45 min - CPT code 89474    Chief complaint/reason for encounter: addictive disorder, anger and anxiety     Interval history and content of current session: Pt referred to me for an individual psychotherapy session by the ABU staff. Pt discussed his previous tx and noted that despite initial misgivings ("I came into the ABU for my family") he now feels he has settled into the program and is glad to be here. He then went on to state that his opiate use started as a tx for rheumatoid arthritis and became out-of-control when his ex-girlfriend committed suicide at his apartment in July, 2016. Since then he has been plagued by intrusive thoughts, nightmares, guilt, anger and feeling like a social pariah. He understands that he was not logically  responsible for what she did but the feelings and images remain and are a source of intense distress. We discussed the way that this event may have triggered a post-traumatic stress disorder and possible targeted treatments for this condition.     Treatment plan:  · Target symptoms: anxiety , substance abuse  · Why chosen therapy is appropriate versus another modality: relevant to diagnosis, patient responds to this modality  · Outcome monitoring methods: self-report, observation, feedback from ABU staff  · Therapeutic intervention type: insight oriented psychotherapy    Risk parameters:  Patient reports no suicidal ideation  Patient reports no homicidal ideation  Patient reports no self-injurious behavior  Patient reports no violent behavior    Verbal deficits: None    Patient's response to intervention:  The patient's response to intervention is accepting.    Progress toward goals and other mental status changes:  The patient's progress toward goals is fair .    Diagnosis:     ICD-10-CM ICD-9-CM   1. Opioid " dependence in remission F11.21 304.03   2. Anxiety F41.9 300.00   3. ADD (attention deficit disorder) F98.8 314.00   R/O PTSD     Plan:  ABU    Return to clinic: as scheduled    Length of Service (minutes): 45

## 2017-01-31 NOTE — PROGRESS NOTES
Group Psychotherapy (PhD/LCSW)    Site: [facility]    Clinical status of patient: Intensive Outpatient Program (IOP)    Date: 1/31/2017    Group Focus: Disease Model of Addiction    Length of service: 52646 - 45-50 minutes    Number of patients in attendance: 9    Referred by: Addictive Behavior Unit Treatment Team    Target symptoms: Substance Abuse    Patient's response to treatment: Active Listening      Progress toward goals: Progressing adequately    Interval History: Discussed the basic neuropsychological concepts of the Disease Model of Addiction and the way they relate to the phenomena of addiction (powerlessnes; euphoric recall; cravings; relapse; etc). Noted the way the disease model comports with the practice of 12-step recovery. Explained the importance of understanding the disease model for sustaining long-term sobriety.     Diagnosis: Opiate use disorder, severe, in early remission    Plan: Continue treatment on ABU

## 2017-01-31 NOTE — PROGRESS NOTES
Group Psychotherapy (PhD/LCSW)    Site: [facility]    Clinical status of patient: Intensive Outpatient Program (IOP)    Date: 1/31/2017    Group Focus: Psychodynamic Group Psychotherapy    Length of service: 52260 - 45-50 minutes    Number of patients in attendance: 10    Referred by: Addictive Behavior Unit Treatment Team    Target symptoms: Substance Abuse    Patient's response to treatment: Active Listening and Self-disclosure    Progress toward goals: Progressing adequately    Interval History: Shared his story with new group member.    Diagnosis: Opiate use disorder, severe, in early remission    Plan: Continue treatment on ABU

## 2017-01-31 NOTE — PLAN OF CARE
01/30/17 1400   Activity/Group Therapy Checklist   Group Goals/Reflection  (incomplete prompts)   Attendance Attended   Follows Direction Followed directions   Group Interactions/Observations Interacted appropriately   Affect/Mood Range Normal range   Affect/Mood Display Appropriate   Goal Progression Progressing

## 2017-01-31 NOTE — PROGRESS NOTES
PSYCHIATRY  ABU Partial Hospitalization  PROGRESS NOTE    Patient Name: Polo Kauffman  2017  11:27 AM  Start Date: 2017  : 1985    Status: Intensive Outpatient Program (IOP)    SUBJECTIVE:    Mr. Castle expressed some feelings about his dead ex-girlfriend to the psychologists. His anxiety is spiking dramatically. Feeling a strong desire to use drugs, and tempted to use Valium. We discussed the issue and him using the Valium to cope with his emotional pain and anxiety, and he agreed he doesn't want to use it. He carries incredible guilt. Also concerned about switching from Vyvanse because it isn't working as well for him. After having some time to cool off, he reported feeling better and returned to group.    Medication Side Effects: denied  Cravings: denied  No other acute psychiatric issues reported at this time.    Scheduled Meds:   Current Outpatient Prescriptions on File Prior to Encounter   Medication Sig Dispense Refill    abatacept (ORENCIA) 125 mg/mL Syrg Inject 125 mg into the skin every 7 days. 4 mL 5    acetaminophen (TYLENOL) 500 MG tablet Take 1 tablet (500 mg total) by mouth every 4 (four) hours as needed for Pain. Do not take more than 3000 mg (6 tablets) daily.  0    baclofen (LIORESAL) 10 MG tablet Take 1 tablet (10 mg total) by mouth 2 (two) times daily. 60 tablet 2    calcium-vitamin D (OSCAL) 250 (625)-125 mg-unit per tablet Take 1 tablet by mouth once daily.      cloNIDine (CATAPRES) 0.1 MG tablet Take 1 tablet (0.1 mg total) by mouth every 4 (four) hours as needed (anxiety). 88 tablet 0    diazePAM (VALIUM) 5 MG tablet Take 1 tablet (5 mg total) by mouth 2 (two) times daily. 28 tablet 0    gabapentin (NEURONTIN) 300 MG capsule Take 1 capsule (300 mg total) by mouth 3 (three) times daily. 90 capsule 2    ibuprofen (ADVIL,MOTRIN) 600 MG tablet Take 1 tablet (600 mg total) by mouth every 6 (six) hours as needed for Pain. 120 tablet 2    LACTOBAC CMB  #3/FOS/PANTETHINE (PROBIOTIC & ACIDOPHILUS ORAL) Take by mouth.      lisdexamfetamine (VYVANSE) 50 MG capsule Take 1 capsule (50 mg total) by mouth every morning. 30 capsule 0    mirtazapine (REMERON) 15 MG tablet       MULTIVIT-IRON-MIN-FOLIC ACID 3,500-18-0.4 UNIT-MG-MG ORAL CHEW Take by mouth.      quetiapine (SEROQUEL) 25 MG Tab Take 25 mg by mouth once daily.      sildenafil (VIAGRA) 100 MG tablet Take 1 tablet (100 mg total) by mouth daily as needed for Erectile Dysfunction. Take 1 hour before intercourse. 10 tablet 11     No current facility-administered medications on file prior to encounter.          ALLERGIES:  Review of patient's allergies indicates:  No Known Allergies      Psychiatric Review Of Systems - Is patient experiencing or having changes in:  sleep: denied   appetite: denied  weight: denied  energy/anergy: denied  interest/pleasure/anhedonia: denied  somatic symptoms: denied  libido: denied  anxiety/panic: drastically increased  guilty/hopelessness: denied  concentration: denied  S.I.B.s/risky behavior: denied  Irritability: denied  Racing thoughts: denied  Impulsive behaviors: denied  Paranoia: denied  AVH: denied    Medical ROS:  See Dr. Vilchis's Admission Note of date 1/26/2017 for ROS.     OBJECTIVE:    Vital Signs (Most Recent)  There were no vitals filed for this visit.    Mental Status Exam:    Appearance: young white male wearing casual clothes  Behavior: cooperative, incredibly anxious and keyed up  Speech: normal rate, tone, and volume  Mood: upset  Affect: full; pained  Thought Process: linear  Thought Perceptions: denied AVH  Thought Content: denied SI, HI; no delusions apparent  Sensorium: awake, alert  Attention/Concentration: intact to conversation  Orientation: person, place, time, and situation  Memory: intact (recent, remote)  Abstraction: intact   Insight: fair  Judgment: fair       Laboratory:  Recent Results (from the past 48 hour(s))   Toxicology screen, urine     Collection Time: 01/30/17 12:27 PM   Result Value Ref Range    Alcohol, Urine <10 <10 mg/dL    Benzodiazepines Negative     Methadone metabolites Negative     Cocaine (Metab.) Negative     Opiate Scrn, Ur Negative     Barbiturate Screen, Ur Negative     Amphetamine Screen, Ur Presumptive Positive     THC Negative     Phencyclidine Negative     Creatinine, Random Ur 107.0 23.0 - 375.0 mg/dL    Toxicology Information SEE COMMENT    POCT BREATH ALCOHOL TEST    Collection Time: 01/30/17 12:28 PM   Result Value Ref Range    Breath Alcohol 0.000    POCT BREATH ALCOHOL TEST    Collection Time: 01/30/17 12:28 PM   Result Value Ref Range    Breath Alcohol 0.000         ASSESSMENT/PLAN:    Opiate Use Disorder, severe  Unspecified Anxiety Disorder    History of ADHD    Status:  Continue treatment on ABU    Plan:     1) Vital signs 3x a week  2) Daily breathalyzer and urine tox screen  3) ABU protocol    Patient's Intervention Response: accepting, reluctant    Medications:       -continue Vyvanse 50 mg PO qd for inattentive symptoms of ADHD; will re-assess  -continue gabapentin 600 mg PO BID for anxiety and for rheumatological purposes   -Zoloft 150 mg PO qd for anxiety and depression  -discontinued clonidine   -patient also received Vivitrol injection before leaving [facility], will investigate further   -will f/u vitals (not uploaded to computer, and he abruptly stopped the clonidine)    Additional Comments: Supportive Psychotherapy x 15 minutes.     Will discuss case with Dr. Doe.     Nicholas King IV, MD  PGY-2 Psychiatry, [facility]  01/31/2017 11:44 AM                  This encounter was reviewed by me and case was discussed with the resident physician. I agree with the assessment and  treatment plan as stated.

## 2017-02-01 ENCOUNTER — HOSPITAL ENCOUNTER (OUTPATIENT)
Dept: PSYCHIATRY | Facility: HOSPITAL | Age: 32
Discharge: HOME | End: 2017-02-01
Attending: PSYCHIATRY & NEUROLOGY
Payer: COMMERCIAL

## 2017-02-01 VITALS — HEART RATE: 114 BPM | RESPIRATION RATE: 18 BRPM | DIASTOLIC BLOOD PRESSURE: 75 MMHG | SYSTOLIC BLOOD PRESSURE: 139 MMHG

## 2017-02-01 DIAGNOSIS — F11.21 OPIOID DEPENDENCE IN REMISSION: ICD-10-CM

## 2017-02-01 DIAGNOSIS — F98.8 ADD (ATTENTION DEFICIT DISORDER): ICD-10-CM

## 2017-02-01 DIAGNOSIS — F41.9 ANXIETY: ICD-10-CM

## 2017-02-01 LAB
AMPHET+METHAMPHET UR QL: NORMAL
BARBITURATES UR QL SCN>200 NG/ML: NEGATIVE
BENZODIAZ UR QL SCN>200 NG/ML: NEGATIVE
BREATH ALCOHOL: 0
BZE UR QL SCN: NEGATIVE
CANNABINOIDS UR QL SCN: NEGATIVE
CREAT UR-MCNC: 103 MG/DL
ETHANOL UR-MCNC: <10 MG/DL
METHADONE UR QL SCN>300 NG/ML: NEGATIVE
OPIATES UR QL SCN: NEGATIVE
PCP UR QL SCN>25 NG/ML: NEGATIVE
TOXICOLOGY INFORMATION: NORMAL

## 2017-02-01 PROCEDURE — 90833 PSYTX W PT W E/M 30 MIN: CPT | Mod: ,,, | Performed by: PSYCHIATRY & NEUROLOGY

## 2017-02-01 PROCEDURE — 90853 GROUP PSYCHOTHERAPY: CPT | Performed by: SOCIAL WORKER

## 2017-02-01 PROCEDURE — 80307 DRUG TEST PRSMV CHEM ANLYZR: CPT

## 2017-02-01 PROCEDURE — 90853 GROUP PSYCHOTHERAPY: CPT | Mod: 59,,, | Performed by: PSYCHOLOGIST

## 2017-02-01 PROCEDURE — 99233 SBSQ HOSP IP/OBS HIGH 50: CPT | Mod: ,,, | Performed by: PSYCHIATRY & NEUROLOGY

## 2017-02-01 PROCEDURE — 90853 GROUP PSYCHOTHERAPY: CPT

## 2017-02-01 RX ORDER — LISDEXAMFETAMINE DIMESYLATE 70 MG/1
70 CAPSULE ORAL EVERY MORNING
Qty: 30 CAPSULE | Refills: 0 | Status: SHIPPED | OUTPATIENT
Start: 2017-02-01 | End: 2017-04-21

## 2017-02-01 NOTE — PROGRESS NOTES
Group Psychotherapy (PhD/LCSW)    Site: [facility]    Clinical status of patient: Intensive Outpatient Program (IOP)    Date: 2/1/2017    Group Focus: Psychodynamic Group Psychotherapy    Length of service: 45788 - 45-50 minutes    Number of patients in attendance: 12    Referred by: Addictive Behavior Unit Treatment Team    Target symptoms: Substance Abuse    Patient's response to treatment: Active Listening and Self-disclosure    Progress toward goals: Progressing adequately    Interval History: Shared his story with new group member.    Diagnosis: Opiate use disorder, severe, in early remission    Plan: Continue treatment on ABU

## 2017-02-01 NOTE — PATIENT CARE CONFERENCE
Opiate Use Disorder, severe, in early remission  Unspecified Anxiety Disorder      1. Pt is attending all groups    2. Pt is attending all meetings  3. Pt 's family is supportive of treatment    4. Pt has completed spiritual assessment    5. Pt will present life story    6. Pt will present Step One assignment    7. Pt is exploring issues related to relapse  prevention; spirituality; stress management; improved communication skills; assertiveness training; poor self-esteem; disease concepts; cross addictions; and, work related issues    8. D/C date: TBD          Staff discussed pt's admittance to program for opiate use disorder and anxiety. Staffing discussed pt completing inpt tx at [facility] prior to admission. Staffing also discussed pt's superficial and narcicisstic attitude and pt splitting between tx team.     Problem: Opioid use d/o, severe, in early remission  Goal: Address in 12 step meetings and group and individual sessions    Objective Measure: participation in groups, self report, length of sobriety, and relapse prevention plan  Time: Prior to discharge    Progress: Pt is attending groups and sessions          Problem: Unspecified Anxiety Disorder  Goal: Address in 12 step meetings and group and individual sessions    Objective Measure: participation in groups, self report, length of sobriety, and relapse prevention plan  Time: Prior to discharge    Progress: Pt is attending groups and sessions            Staff members present:    MD Dr. Brook Decker MD Dr. Simpson, MD Dr. Correa, Ph.D.  Sumeet Kearns, [facility]  Kellen Bray, [facility] [place]  Tracey Chaudhry RN

## 2017-02-01 NOTE — PROGRESS NOTES
"PSYCHIATRY  ABU Partial Hospitalization  PROGRESS NOTE    Patient Name: Polo Kauffman  2017  11:27 AM  Start Date: 2017  : 1985    Status: Intensive Outpatient Program (IOP)    CC/Principal Problem: Opioid Use Disorder    SUBJECTIVE:  Pt c/o anxiety and intense cravings 2/2 discussing ex-lover's (Sravani) suicide with therapist yesterday.  Felt that the story/his feelings "were yanked out of him."  Pt thinks about the rigor mortis everyday, and it "freaks [him] out."  Intrusive images about her death face.  Angry and ruminates about the "stigma of 'Osman woke up with a dead girl in his bed.' I feel like I'm tarnished by that."  Also, angry that people  him for "not grieving about it and moving on," and his girlfriend now has this "trump card against" him.  Has buried it for the past 9 weeks and is now exhuming it while sober.  Focused on effect of lover's suicide on his reputation and social impact to him and his family.      Does not believe Vyvanse is working for him at all.  He feels no difference from when he is not taking it, but no SE like sweating with Adderall.  Pt was diagnosed with learning disorders in fourth grade and has been taking Adderall since 8th grade.  Since 2016, pt has been taking Adderall IR 20 mg qam, 20 mg with lunch.  Stimulants help him retain information while reading, improve concentration.  Has been taking Zoloft and Gabapentin as instructed.             Medication Side Effects: denied  Cravings: yes, triggered by talking about ex-lover's suicide    Scheduled Meds:   Current Outpatient Prescriptions on File Prior to Encounter   Medication Sig Dispense Refill    abatacept (ORENCIA) 125 mg/mL Syrg Inject 125 mg into the skin every 7 days. 4 mL 5    acetaminophen (TYLENOL) 500 MG tablet Take 1 tablet (500 mg total) by mouth every 4 (four) hours as needed for Pain. Do not take more than 3000 mg (6 tablets) daily.  0    baclofen (LIORESAL) 10 MG " tablet Take 1 tablet (10 mg total) by mouth 2 (two) times daily. 60 tablet 2    calcium-vitamin D (OSCAL) 250 (625)-125 mg-unit per tablet Take 1 tablet by mouth once daily.      cloNIDine (CATAPRES) 0.1 MG tablet Take 1 tablet (0.1 mg total) by mouth every 4 (four) hours as needed (anxiety). 88 tablet 0    diazePAM (VALIUM) 5 MG tablet Take 1 tablet (5 mg total) by mouth 2 (two) times daily. 28 tablet 0    gabapentin (NEURONTIN) 300 MG capsule Take 1 capsule (300 mg total) by mouth 3 (three) times daily. 90 capsule 2    ibuprofen (ADVIL,MOTRIN) 600 MG tablet Take 1 tablet (600 mg total) by mouth every 6 (six) hours as needed for Pain. 120 tablet 2    LACTOBAC CMB #3/FOS/PANTETHINE (PROBIOTIC & ACIDOPHILUS ORAL) Take by mouth.      lisdexamfetamine (VYVANSE) 50 MG capsule Take 1 capsule (50 mg total) by mouth every morning. 30 capsule 0    mirtazapine (REMERON) 15 MG tablet       MULTIVIT-IRON-MIN-FOLIC ACID 3,500-18-0.4 UNIT-MG-MG ORAL CHEW Take by mouth.      quetiapine (SEROQUEL) 25 MG Tab Take 25 mg by mouth once daily.      sildenafil (VIAGRA) 100 MG tablet Take 1 tablet (100 mg total) by mouth daily as needed for Erectile Dysfunction. Take 1 hour before intercourse. 10 tablet 11     No current facility-administered medications on file prior to encounter.      ALLERGIES:  Review of patient's allergies indicates:  No Known Allergies    Psychiatric Review Of Systems - Is patient experiencing or having changes in:  sleep: denied   appetite: denied  weight: denied  energy/anergy: denied  interest/pleasure/anhedonia: denied  somatic symptoms: denied  libido: denied  anxiety/panic: yes  guilty/hopelessness: denied  concentration: denied  S.I.B.s/risky behavior: denied  Irritability: denied  Racing thoughts: denied  Impulsive behaviors: denied  Paranoia: denied  AVH: denied    Medical ROS:  See Dr. Vilchis's Admission Note of date 1/26/2017 for ROS.     OBJECTIVE:    Vital Signs (Most Recent)  Vitals:     "02/01/17 1339   BP: 139/75   Pulse: (!) 114   Resp: 18     Mental Status Exam:  Appearance: young white male wearing casual clothes, prominent facial flushing while talking, armpit sweatstains  Behavior: cooperative, calm  Speech: normal rate, tone, and volume  Mood: upset  Affect: full, tearful  Thought Process: linear  Thought Perceptions: denied AVH  Thought Content: denied SI, HI; no delusions apparent  Sensorium: awake, alert  Attention/Concentration: intact to conversation  Orientation: person, place, time, and situation  Memory: intact (recent, remote)  Abstraction: intact   Insight: fair  Judgment: fair    Laboratory:  Recent Results (from the past 48 hour(s))   Toxicology screen, urine    Collection Time: 01/31/17 12:51 PM   Result Value Ref Range    Alcohol, Urine <10 <10 mg/dL    Benzodiazepines Negative     Methadone metabolites Negative     Cocaine (Metab.) Negative     Opiate Scrn, Ur Negative     Barbiturate Screen, Ur Negative     Amphetamine Screen, Ur Presumptive Positive     THC Negative     Phencyclidine Negative     Creatinine, Random Ur 105.0 23.0 - 375.0 mg/dL    Toxicology Information SEE COMMENT    POCT BREATH ALCOHOL TEST    Collection Time: 01/31/17 12:52 PM   Result Value Ref Range    Breath Alcohol 0.000    POCT BREATH ALCOHOL TEST    Collection Time: 02/01/17  1:39 PM   Result Value Ref Range    Breath Alcohol 0.000         ASSESSMENT/PLAN:  Opiate Use Disorder, severe  Unspecified Anxiety Disorder  Attention Deficit Disorder    R/O PTSD    Cluster B traits:  Narcissisism      Status:  Continue treatment on ABU    Plan:   1) Vital signs 3x a week  2) Daily breathalyzer and urine tox screen  3) ABU protocol  4) Pt attempts to "split" staff.  See pt as team.      Patient's Intervention Response: accepting, reluctant    Medications:    -continue Vyvanse 50 mg PO qd for inattentive symptoms of ADHD.  Increase to 70 mg qd 2/1.  Pt will bring back remaining Vyvanse 50 mg for safe disposal.  "   -continue gabapentin 600 mg PO BID for anxiety and for rheumatological purposes   -Zoloft 150 mg PO qd for anxiety and depression  -discontinued clonidine   -patient also received Vivitrol injection before leaving [facility], will investigate further   -Referral for EMDR treatment  -will f/u vitals (not uploaded to computer, and he abruptly stopped the clonidine)    Additional Comments: Supportive Psychotherapy x20 minutes. Discussed trauma of exgirlfriend suiciding in his home and its effect on his interpersonal relationships, identity and future outlook.    Pt seen and discussed with Dr. Doe.         Alex Barrientos MD  [facility]  PGY-2  Pager:  [phone]          This encounter was reviewed by me and case was discussed with the resident physician. I agree with the assessment and  treatment plan as stated.    Karla Doe MD    PSYCHOTHERAPY ADD-ON +[id number]   30 (16-37*) minutes    Site: [facility]  Time: 20 minutes  Participants: Met with patient    Therapeutic Intervention Type: supportive psychotherapy  Why chosen therapy is appropriate versus another modality: relevant to diagnosis    Target symptoms: anxiety , mood swings  Primary focus: traumatic event, sense of indentity, anxiety  Psychotherapeutic techniques: validation, behavioral suggestions    Outcome monitoring methods: self-report, observation    Patient's response to intervention:  The patient's response to intervention is accepting.    Progress toward goals:  The patient's progress toward goals is good.

## 2017-02-02 ENCOUNTER — PATIENT MESSAGE (OUTPATIENT)
Dept: UROLOGY | Facility: CLINIC | Age: 32
End: 2017-02-02

## 2017-02-02 ENCOUNTER — HOSPITAL ENCOUNTER (OUTPATIENT)
Dept: PSYCHIATRY | Facility: HOSPITAL | Age: 32
Discharge: HOME | End: 2017-02-02
Attending: PSYCHIATRY & NEUROLOGY
Payer: COMMERCIAL

## 2017-02-02 DIAGNOSIS — F98.8 ADD (ATTENTION DEFICIT DISORDER): ICD-10-CM

## 2017-02-02 DIAGNOSIS — F11.21 OPIOID DEPENDENCE IN REMISSION: Primary | ICD-10-CM

## 2017-02-02 DIAGNOSIS — F41.9 ANXIETY: ICD-10-CM

## 2017-02-02 LAB
AMPHET+METHAMPHET UR QL: NORMAL
BARBITURATES UR QL SCN>200 NG/ML: NEGATIVE
BENZODIAZ UR QL SCN>200 NG/ML: NEGATIVE
BREATH ALCOHOL: 0
BZE UR QL SCN: NEGATIVE
CANNABINOIDS UR QL SCN: NEGATIVE
CREAT UR-MCNC: 34 MG/DL
ETHANOL UR-MCNC: <10 MG/DL
METHADONE UR QL SCN>300 NG/ML: NEGATIVE
OPIATES UR QL SCN: NEGATIVE
PCP UR QL SCN>25 NG/ML: NEGATIVE
TOXICOLOGY INFORMATION: NORMAL

## 2017-02-02 PROCEDURE — 90853 GROUP PSYCHOTHERAPY: CPT | Mod: 59,,, | Performed by: PSYCHOLOGIST

## 2017-02-02 PROCEDURE — 90853 GROUP PSYCHOTHERAPY: CPT

## 2017-02-02 PROCEDURE — 90853 GROUP PSYCHOTHERAPY: CPT | Performed by: SOCIAL WORKER

## 2017-02-02 PROCEDURE — 80307 DRUG TEST PRSMV CHEM ANLYZR: CPT

## 2017-02-02 NOTE — PROGRESS NOTES
Group Psychotherapy (PhD/LCSW)    Site: [facility]    Clinical status of patient: Intensive Outpatient Program (IOP)    Date: 2/2/2017    Group Focus: Psychodynamic Group Psychotherapy    Length of service: 02681 - 45-50 minutes    Number of patients in attendance: 11    Referred by: Addictive Behavior Unit Treatment Team    Target symptoms: Substance Abuse    Patient's response to treatment: Active Listening and Self-disclosure    Progress toward goals: Progressing adequately    Interval History: Shared his story with new group member and discussed his bottom.    Diagnosis: Opiate use disorder, severe, in early remission    Plan: Continue treatment on ABU

## 2017-02-02 NOTE — PROGRESS NOTES
Group Psychotherapy (PhD/LCSW)    Site: [facility]    Clinical status of patient: Intensive Outpatient Program (IOP)    Date: 2/2/2017    Group Focus: Stress Management    Length of service: 51662 - 45-50 minutes    Number of patients in attendance: 15    Referred by: Addictive Behavior Unit Treatment Team    Target symptoms: Substance Abuse    Patient's response to treatment: Active Listening and Self-disclosure    Progress toward goals: Progressing adequately    Interval History: Discussed non-verbal aspects of assertive behavior and role played being assertive.    Diagnosis: Opiate use disorder, severe, in early remission    Plan: Continue treatment on ABU
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
## 2017-02-03 ENCOUNTER — HOSPITAL ENCOUNTER (OUTPATIENT)
Dept: PSYCHIATRY | Facility: HOSPITAL | Age: 32
Discharge: HOME | End: 2017-02-03
Attending: PSYCHIATRY & NEUROLOGY
Payer: COMMERCIAL

## 2017-02-03 VITALS — SYSTOLIC BLOOD PRESSURE: 129 MMHG | RESPIRATION RATE: 16 BRPM | HEART RATE: 109 BPM | DIASTOLIC BLOOD PRESSURE: 88 MMHG

## 2017-02-03 DIAGNOSIS — F41.9 ANXIETY: ICD-10-CM

## 2017-02-03 DIAGNOSIS — F11.21 OPIOID DEPENDENCE IN REMISSION: Primary | ICD-10-CM

## 2017-02-03 DIAGNOSIS — F98.8 ADD (ATTENTION DEFICIT DISORDER): ICD-10-CM

## 2017-02-03 LAB
AMPHET+METHAMPHET UR QL: NORMAL
BARBITURATES UR QL SCN>200 NG/ML: NEGATIVE
BENZODIAZ UR QL SCN>200 NG/ML: NEGATIVE
BREATH ALCOHOL: 0
BZE UR QL SCN: NEGATIVE
CANNABINOIDS UR QL SCN: NEGATIVE
CREAT UR-MCNC: 45 MG/DL
ETHANOL UR-MCNC: <10 MG/DL
METHADONE UR QL SCN>300 NG/ML: NEGATIVE
OPIATES UR QL SCN: NEGATIVE
PCP UR QL SCN>25 NG/ML: NEGATIVE
TOXICOLOGY INFORMATION: NORMAL

## 2017-02-03 PROCEDURE — 80307 DRUG TEST PRSMV CHEM ANLYZR: CPT

## 2017-02-03 PROCEDURE — 90853 GROUP PSYCHOTHERAPY: CPT | Mod: 59,,, | Performed by: PSYCHOLOGIST

## 2017-02-03 PROCEDURE — 99232 SBSQ HOSP IP/OBS MODERATE 35: CPT | Mod: ,,, | Performed by: PSYCHIATRY & NEUROLOGY

## 2017-02-03 PROCEDURE — 90853 GROUP PSYCHOTHERAPY: CPT

## 2017-02-03 PROCEDURE — 90853 GROUP PSYCHOTHERAPY: CPT | Mod: ,,, | Performed by: PSYCHOLOGIST

## 2017-02-03 PROCEDURE — 90853 GROUP PSYCHOTHERAPY: CPT | Performed by: SOCIAL WORKER

## 2017-02-03 NOTE — PLAN OF CARE
02/03/17 1000   Activity/Group Therapy Checklist   Group Addiction Education   Attendance Attended   Follows Direction Followed directions   Group Interactions/Observations Interacted appropriately   Affect/Mood Range Normal range   Affect/Mood Display Appropriate   Goal Progression Progressing

## 2017-02-03 NOTE — PROGRESS NOTES
Group Psychotherapy (PhD/LCSW)    Site: [facility]    Clinical status of patient: Intensive Outpatient Program (IOP)    Date: 2/3/2017    Group Focus: Psychodynamic Group Psychotherapy    Length of service: 38722 - 45-50 minutes    Number of patients in attendance: 11    Referred by: Addictive Behavior Unit Treatment Team    Target symptoms: Substance Abuse    Patient's response to treatment: Active Listening and Self-disclosure    Progress toward goals: Progressing adequately    Interval History: Discussed taking responsibility for one's actions during active addiction and assuming a stance of humility.     Diagnosis: Opiate use disorder, severe, in early remission    Plan: Continue treatment on ABU

## 2017-02-03 NOTE — PROGRESS NOTES
Group Psychotherapy (PhD/LCSW)    Site: [facility]    Clinical status of patient: Intensive Outpatient Program (IOP)    Date: 2/3/2017    Group Focus: Stress Management    Length of service: 11930 - 45-50 minutes    Number of patients in attendance: 16    Referred by: Addictive Behavior Unit Treatment Team    Target symptoms: Substance Abuse    Patient's response to treatment: Active Listening    Progress toward goals: Progressing adequately    Interval History: Group learned mindfulness techniques (breathing, eating) to improve present-moment awareness, impulsive behavior tendencies, and tolerance of various emotional states.    Diagnosis: Opioid Use Disorder in early remission    Plan: Continue treatment on ABU

## 2017-02-03 NOTE — PROGRESS NOTES
"PSYCHIATRY  ABU Partial Hospitalization  PROGRESS NOTE    Patient Name: Polo Kauffman  2/3/2017  11:27 AM  Start Date: 2017  : 1985    Status: Intensive Outpatient Program (IOP)  CC: Opioid Use Disorder    SUBJECTIVE:  Pt reports "okay" mood.  Dr. Mcfarland, rheumatologist, recommended pt donate sperm for preservation 2/2 potential future medical complications (spermatogenesis) with RA, RA meds and seizures.  Pt has appointment today to do so and is preoccupied with the "weirdness."  Reports weekend plans of retrieiving new puppy.  Will stay in [place] at family's weekend home with friends and family over for Superbowl .  States that parents want people to see him b/c he's "done great in rehab and lost 50#."  Reports improved basal anxiety with Gabapentin.  Has not been able to fill Vyvanse 70 mg yet.  Pt denies recent testosterone use.  Osman Garay, pulmonologist, was pt's PCP before (2 father's friendship with Dr. Garay).  Has now switched to Dr. Steel, Internal Medicine, seen once.                                   Medication Side Effects: denied  Cravings: no    Scheduled Meds:   Current Outpatient Prescriptions on File Prior to Encounter   Medication Sig Dispense Refill    abatacept (ORENCIA) 125 mg/mL Syrg Inject 125 mg into the skin every 7 days. 4 mL 5    acetaminophen (TYLENOL) 500 MG tablet Take 1 tablet (500 mg total) by mouth every 4 (four) hours as needed for Pain. Do not take more than 3000 mg (6 tablets) daily.  0    baclofen (LIORESAL) 10 MG tablet Take 1 tablet (10 mg total) by mouth 2 (two) times daily. 60 tablet 2    calcium-vitamin D (OSCAL) 250 (625)-125 mg-unit per tablet Take 1 tablet by mouth once daily.      cloNIDine (CATAPRES) 0.1 MG tablet Take 1 tablet (0.1 mg total) by mouth every 4 (four) hours as needed (anxiety). 88 tablet 0    diazePAM (VALIUM) 5 MG tablet Take 1 tablet (5 mg total) by mouth 2 (two) times daily. 28 tablet 0    gabapentin " (NEURONTIN) 300 MG capsule Take 1 capsule (300 mg total) by mouth 3 (three) times daily. 90 capsule 2    ibuprofen (ADVIL,MOTRIN) 600 MG tablet Take 1 tablet (600 mg total) by mouth every 6 (six) hours as needed for Pain. 120 tablet 2    LACTOBAC CMB #3/FOS/PANTETHINE (PROBIOTIC & ACIDOPHILUS ORAL) Take by mouth.      lisdexamfetamine (VYVANSE) 50 MG capsule Take 1 capsule (50 mg total) by mouth every morning. 30 capsule 0    lisdexamfetamine (VYVANSE) 70 MG capsule Take 1 capsule (70 mg total) by mouth every morning. 30 capsule 0    mirtazapine (REMERON) 15 MG tablet       MULTIVIT-IRON-MIN-FOLIC ACID 3,500-18-0.4 UNIT-MG-MG ORAL CHEW Take by mouth.      quetiapine (SEROQUEL) 25 MG Tab Take 25 mg by mouth once daily.      sildenafil (VIAGRA) 100 MG tablet Take 1 tablet (100 mg total) by mouth daily as needed for Erectile Dysfunction. Take 1 hour before intercourse. 10 tablet 11     No current facility-administered medications on file prior to encounter.      ALLERGIES:  Review of patient's allergies indicates:  No Known Allergies    Psychiatric Review Of Systems - Is patient experiencing or having changes in:  sleep: yes, not tired during the day but sleep is intermittent through the night  appetite: denied  Weight: lost 50# since beginning rehab  energy/anergy: denied  interest/pleasure/anhedonia: denied  somatic symptoms: denied  libido: denied  anxiety/panic: yes, improved basal level of anxiety on Gabapentin  guilty/hopelessness: denied  concentration: denied  S.I.B.s/risky behavior: denied  Irritability: denied  Racing thoughts: denied  Impulsive behaviors: denied  Paranoia: denied  AVH: denied    Medical ROS:  See Dr. Vilchis's Admission Note of date 1/26/2017 for ROS.     OBJECTIVE:  Vital Signs (Most Recent)  There were no vitals filed for this visit.     Mental Status Exam:  Appearance: young white male wearing casual clothes, prominent facial flushing while talking, armpit sweatstains  Behavior:  "cooperative, calm  Speech: normal rate, tone, and volume  Mood: "okay"  Affect: full  Thought Process: linear  Thought Perceptions: denied AVH  Thought Content: denied SI, HI; no delusions apparent  Sensorium: awake, alert  Attention/Concentration: intact to conversation  Orientation: person, place, time, and situation  Memory: intact (recent, remote)  Abstraction: intact   Insight: fair  Judgment: fair    Laboratory:  Recent Results (from the past 48 hour(s))   Toxicology screen, urine    Collection Time: 02/01/17  1:37 PM   Result Value Ref Range    Alcohol, Urine <10 <10 mg/dL    Benzodiazepines Negative     Methadone metabolites Negative     Cocaine (Metab.) Negative     Opiate Scrn, Ur Negative     Barbiturate Screen, Ur Negative     Amphetamine Screen, Ur Presumptive Positive     THC Negative     Phencyclidine Negative     Creatinine, Random Ur 103.0 23.0 - 375.0 mg/dL    Toxicology Information SEE COMMENT    POCT BREATH ALCOHOL TEST    Collection Time: 02/01/17  1:39 PM   Result Value Ref Range    Breath Alcohol 0.000    Toxicology screen, urine    Collection Time: 02/02/17 12:16 PM   Result Value Ref Range    Alcohol, Urine <10 <10 mg/dL    Benzodiazepines Negative     Methadone metabolites Negative     Cocaine (Metab.) Negative     Opiate Scrn, Ur Negative     Barbiturate Screen, Ur Negative     Amphetamine Screen, Ur Presumptive Positive     THC Negative     Phencyclidine Negative     Creatinine, Random Ur 34.0 23.0 - 375.0 mg/dL    Toxicology Information SEE COMMENT    POCT BREATH ALCOHOL TEST    Collection Time: 02/02/17 12:16 PM   Result Value Ref Range    Breath Alcohol 0.000         ASSESSMENT/PLAN:  Opiate Use Disorder, severe  Unspecified Anxiety Disorder  Attention Deficit Disorder  R/O PTSD    Cluster B traits:  Narcissisism    Status:  Continue treatment on ABU    Plan:   1) Vital signs 3x a week  2) Daily breathalyzer and urine tox screen  3) ABU protocol  4) Pt attempts to "split" staff.  See " pt as team MWF.    Patient's Intervention Response: accepting, reluctant    Medications:    -Vyvanse 50 mg PO daily for inattentive symptoms of ADHD.  Increase to 70 mg qd 2/1.  Pt will bring back remaining Vyvanse 50 mg for safe disposal.    -Gabapentin 600 mg PO BID for anxiety and for RA pain  -Zoloft 150 mg PO daily for anxiety and depression  -Discontinued Clonidine   -patient also received Vivitrol injection before leaving [facility], will investigate further   -Will refer for EMDR treatment at discharge    Additional Comments: supportive psychotherapy x20 minutes    Pt seen and discussed with Dr. Doe.         Alex Barrientos MD  [facility]  PGY-2  Pager:  [phone]    Attending Attestation:    I have independently evaluated the patient and discussed the case with the resident. I have reviewed this note and agree with its contents, as well as the assessment and plan.     Karla Doe MD    PSYCHOTHERAPY ADD-ON +[id number]   30 (16-37*) minutes    Site: [facility]  Time:20 minutes  Participants: Met with patient    Therapeutic Intervention Type: supportive psychotherapy  Why chosen therapy is appropriate versus another modality: relevant to diagnosis    Target symptoms: anxiety , substance abuse  Primary focus: trauma, risk for relapse, anxiety  Psychotherapeutic techniques: validation, behavioral counseling    Outcome monitoring methods: self-report, observation    Patient's response to intervention:  The patient's response to intervention is accepting.    Progress toward goals:  The patient's progress toward goals is good.

## 2017-02-06 ENCOUNTER — HOSPITAL ENCOUNTER (OUTPATIENT)
Dept: PSYCHIATRY | Facility: HOSPITAL | Age: 32
Discharge: HOME | End: 2017-02-06
Attending: PSYCHIATRY & NEUROLOGY
Payer: COMMERCIAL

## 2017-02-06 VITALS — RESPIRATION RATE: 18 BRPM | HEART RATE: 105 BPM | DIASTOLIC BLOOD PRESSURE: 77 MMHG | SYSTOLIC BLOOD PRESSURE: 109 MMHG

## 2017-02-06 DIAGNOSIS — F98.8 ADD (ATTENTION DEFICIT DISORDER): ICD-10-CM

## 2017-02-06 DIAGNOSIS — F11.21 OPIOID DEPENDENCE IN REMISSION: ICD-10-CM

## 2017-02-06 DIAGNOSIS — F41.9 ANXIETY: ICD-10-CM

## 2017-02-06 LAB
AMPHET+METHAMPHET UR QL: NORMAL
BARBITURATES UR QL SCN>200 NG/ML: NEGATIVE
BENZODIAZ UR QL SCN>200 NG/ML: NEGATIVE
BREATH ALCOHOL: 0
BZE UR QL SCN: NEGATIVE
CANNABINOIDS UR QL SCN: NEGATIVE
CREAT UR-MCNC: 33 MG/DL
ETHANOL UR-MCNC: <10 MG/DL
METHADONE UR QL SCN>300 NG/ML: NEGATIVE
OPIATES UR QL SCN: NEGATIVE
PCP UR QL SCN>25 NG/ML: NEGATIVE
TOXICOLOGY INFORMATION: NORMAL

## 2017-02-06 PROCEDURE — 99232 SBSQ HOSP IP/OBS MODERATE 35: CPT | Mod: ,,, | Performed by: PSYCHIATRY & NEUROLOGY

## 2017-02-06 PROCEDURE — 90853 GROUP PSYCHOTHERAPY: CPT

## 2017-02-06 PROCEDURE — 90853 GROUP PSYCHOTHERAPY: CPT | Performed by: SOCIAL WORKER

## 2017-02-06 PROCEDURE — 90853 GROUP PSYCHOTHERAPY: CPT | Mod: 59,,, | Performed by: PSYCHOLOGIST

## 2017-02-06 PROCEDURE — 90853 GROUP PSYCHOTHERAPY: CPT | Mod: ,,, | Performed by: PSYCHOLOGIST

## 2017-02-06 PROCEDURE — 80307 DRUG TEST PRSMV CHEM ANLYZR: CPT

## 2017-02-06 RX ORDER — PRAZOSIN HYDROCHLORIDE 1 MG/1
CAPSULE ORAL
Qty: 60 CAPSULE | Refills: 1 | Status: SHIPPED | OUTPATIENT
Start: 2017-02-06 | End: 2017-02-07 | Stop reason: SDUPTHER

## 2017-02-06 NOTE — PROGRESS NOTES
Group Psychotherapy (PhD/LCSW)    Site: [facility]    Clinical status of patient: Intensive Outpatient Program (IOP)    Date: 2/6/2017    Group Focus: Communication Skills       Length of service: 31466 - 45-50 minutes    Number of patients in attendance: 20    Referred by: Addictive Behavior Unit Treatment Team    Target symptoms: Substance Abuse    Patient's response to treatment: Active Listening      Progress toward goals: Progressing adequately    Interval History: Discussed basic communication skills (I-messages, Reflective Listening, Contextual considerations) and modeled the way they can be used to overcome communication problems and enhance relationship dynamics.     Diagnosis: Opiate use disorder, severe, in early remission    Plan: Continue treatment on ABU

## 2017-02-06 NOTE — PROGRESS NOTES
Records were requested from [facility] on 2/6/17 at 10:00am. Raven spoke to Gibson Duncan at (p) [phone].      Kellen Bray LMSW

## 2017-02-06 NOTE — PROGRESS NOTES
Group Psychotherapy (PhD/LCSW)    Site: [facility]    Clinical status of patient: Intensive Outpatient Program (IOP)    Date: 2/6/2017    Group Focus: Psychodynamic Group Psychotherapy    Length of service: 45472 - 45-50 minutes    Number of patients in attendance: 12    Referred by: Addictive Behavior Unit Treatment Team    Target symptoms: Substance Abuse    Patient's response to treatment: Active Listening and Self-disclosure    Progress toward goals: Progressing adequately    Interval History: Shared his story with new group member and discussed lessons learned in recovery.    Diagnosis: Opiate use disorder, severe, in early remission    Plan: Continue treatment on ABU

## 2017-02-06 NOTE — PROGRESS NOTES
"PSYCHIATRY  ABU Partial Hospitalization  PROGRESS NOTE    Patient Name: Polo Kauffman  2017  11:27 AM  Start Date: 2017  : 1985    Status: Intensive Outpatient Program (IOP)  CC: Opioid Use Disorder    SUBJECTIVE:  Pt reports having a pretty good weekend. He did go through the process of giving sperm for freezing just in case that would be necessary in the future, per recommendation of his PCP. Over the weekend, he spent time with his parents which went mostly well. Although they are mostly supportive, he describes that they find the times that he chooses to go to meetings inconvenient. For example, they invited guests over for the evening. He had not known about it, and had planned to go to a meeting. He still went to the meeting but was frustrated that his parents felt it was a " out." He also continues to experience nightmares and flashbacks about 3-5 times a week. We discussed trying prazosin to help with PTSD symptoms, and he was agreeable to giving it a try. In addition he states that his sexual function has seemed to improve, but is not where he thinks it should be. We discussed the balance between recovery, medications and side effects. Should things continue not to improve, might consider decreasing zoloft.              Medication Side Effects: denied  Cravings: no    Scheduled Meds:   Current Outpatient Prescriptions on File Prior to Encounter   Medication Sig Dispense Refill    abatacept (ORENCIA) 125 mg/mL Syrg Inject 125 mg into the skin every 7 days. 4 mL 5    acetaminophen (TYLENOL) 500 MG tablet Take 1 tablet (500 mg total) by mouth every 4 (four) hours as needed for Pain. Do not take more than 3000 mg (6 tablets) daily.  0    baclofen (LIORESAL) 10 MG tablet Take 1 tablet (10 mg total) by mouth 2 (two) times daily. 60 tablet 2    calcium-vitamin D (OSCAL) 250 (625)-125 mg-unit per tablet Take 1 tablet by mouth once daily.      gabapentin (NEURONTIN) 300 MG capsule " Take 1 capsule (300 mg total) by mouth 3 (three) times daily. 90 capsule 2    ibuprofen (ADVIL,MOTRIN) 600 MG tablet Take 1 tablet (600 mg total) by mouth every 6 (six) hours as needed for Pain. 120 tablet 2    LACTOBAC CMB #3/FOS/PANTETHINE (PROBIOTIC & ACIDOPHILUS ORAL) Take by mouth.      lisdexamfetamine (VYVANSE) 70 MG capsule Take 1 capsule (70 mg total) by mouth every morning. 30 capsule 0    mirtazapine (REMERON) 15 MG tablet       MULTIVIT-IRON-MIN-FOLIC ACID 3,500-18-0.4 UNIT-MG-MG ORAL CHEW Take by mouth.      sildenafil (VIAGRA) 100 MG tablet Take 1 tablet (100 mg total) by mouth daily as needed for Erectile Dysfunction. Take 1 hour before intercourse. 10 tablet 11    [DISCONTINUED] cloNIDine (CATAPRES) 0.1 MG tablet Take 1 tablet (0.1 mg total) by mouth every 4 (four) hours as needed (anxiety). 88 tablet 0    [DISCONTINUED] diazePAM (VALIUM) 5 MG tablet Take 1 tablet (5 mg total) by mouth 2 (two) times daily. 28 tablet 0    [DISCONTINUED] lisdexamfetamine (VYVANSE) 50 MG capsule Take 1 capsule (50 mg total) by mouth every morning. 30 capsule 0    [DISCONTINUED] quetiapine (SEROQUEL) 25 MG Tab Take 25 mg by mouth once daily.       No current facility-administered medications on file prior to encounter.      ALLERGIES:  Review of patient's allergies indicates:  No Known Allergies    Psychiatric Review Of Systems - Is patient experiencing or having changes in:  sleep: yes, not tired during the day but sleep is intermittent through the night  appetite: denied  Weight: lost 50# since beginning rehab  energy/anergy: denied  interest/pleasure/anhedonia: denied  somatic symptoms: denied  libido: denied  anxiety/panic: yes, improved basal level of anxiety on Gabapentin  guilty/hopelessness: denied  concentration: denied  S.I.B.s/risky behavior: denied  Irritability: denied  Racing thoughts: denied  Impulsive behaviors: denied  Paranoia: denied  AVH: denied    Medical ROS:  See Dr. Vilchis's Admission  "Note of date 1/26/2017 for ROS.     OBJECTIVE:    Vitals:    02/06/17 1240   BP: 109/77   Pulse: 105   Resp: 18       Mental Status Exam:  Appearance: young white male wearing casual clothes  Behavior: cooperative, calm  Speech: normal rate, tone, and volume  Mood: "okay"  Affect: full  Thought Process: linear  Thought Perceptions: denied AVH  Thought Content: denied SI, HI; no delusions apparent  Sensorium: awake, alert  Attention/Concentration: intact to conversation  Orientation: person, place, time, and situation  Memory: intact (recent, remote)  Abstraction: intact   Insight: fair  Judgment: fair        ASSESSMENT/PLAN:  Opiate Use Disorder, severe  Unspecified Anxiety Disorder  Attention Deficit Disorder  PTSD    Cluster B traits:  Narcissisism    Status:  Continue treatment on ABU    Plan:   1) Vital signs 3x a week  2) Daily breathalyzer and urine tox screen  3) ABU protocol      Patient's Intervention Response: accepting, reluctant    Medications:    -Vyvanse 50 mg PO daily for inattentive symptoms of ADHD.  Increase to 70 mg qd 2/1.  Pt has not picked up new script yet, but plans to today.  -Gabapentin 600 mg PO BID for anxiety and for RA pain  -Zoloft 150 mg PO daily for anxiety and depression  - Remeron 15mg PO qhs  - Begin prazosin 1mg PO qhs, may titrate up  -Will refer for EMDR treatment at discharge    Additional Comments: none  Pt seen and discussed with Dr. Doe.       Vandana Price MD, MPH  [facility]  [facility]    Attending Attestation:    I have independently evaluated the patient and discussed the case with the resident. I have reviewed this note and agree with its contents, as well as the assessment and plan.     Karla Doe MD  "

## 2017-02-07 ENCOUNTER — HOSPITAL ENCOUNTER (OUTPATIENT)
Dept: PSYCHIATRY | Facility: HOSPITAL | Age: 32
Discharge: HOME | End: 2017-02-07
Attending: PSYCHIATRY & NEUROLOGY
Payer: COMMERCIAL

## 2017-02-07 DIAGNOSIS — F98.8 ADD (ATTENTION DEFICIT DISORDER): ICD-10-CM

## 2017-02-07 DIAGNOSIS — F41.9 ANXIETY: ICD-10-CM

## 2017-02-07 DIAGNOSIS — F11.21 OPIOID DEPENDENCE IN REMISSION: Primary | Chronic | ICD-10-CM

## 2017-02-07 LAB
AMPHET+METHAMPHET UR QL: NEGATIVE
BARBITURATES UR QL SCN>200 NG/ML: NEGATIVE
BENZODIAZ UR QL SCN>200 NG/ML: NEGATIVE
BREATH ALCOHOL: 0
BZE UR QL SCN: NEGATIVE
CANNABINOIDS UR QL SCN: NEGATIVE
CREAT UR-MCNC: 24 MG/DL
ETHANOL UR-MCNC: <10 MG/DL
METHADONE UR QL SCN>300 NG/ML: NEGATIVE
OPIATES UR QL SCN: NEGATIVE
PCP UR QL SCN>25 NG/ML: NEGATIVE
TOXICOLOGY INFORMATION: NORMAL

## 2017-02-07 PROCEDURE — 90853 GROUP PSYCHOTHERAPY: CPT | Mod: 59,,, | Performed by: PSYCHOLOGIST

## 2017-02-07 PROCEDURE — 90853 GROUP PSYCHOTHERAPY: CPT

## 2017-02-07 PROCEDURE — 90853 GROUP PSYCHOTHERAPY: CPT | Mod: ,,, | Performed by: PSYCHOLOGIST

## 2017-02-07 PROCEDURE — 80307 DRUG TEST PRSMV CHEM ANLYZR: CPT

## 2017-02-07 PROCEDURE — 90853 GROUP PSYCHOTHERAPY: CPT | Performed by: SOCIAL WORKER

## 2017-02-07 RX ORDER — PRAZOSIN HYDROCHLORIDE 1 MG/1
CAPSULE ORAL
Qty: 60 CAPSULE | Refills: 1 | Status: SHIPPED | OUTPATIENT
Start: 2017-02-07 | End: 2017-04-21

## 2017-02-07 NOTE — PROGRESS NOTES
Group Psychotherapy (PhD/LCSW)    Site: [facility]    Clinical status of patient: Intensive Outpatient Program (IOP)    Date: 2/7/2017    Group Focus: Psychodynamic Group Psychotherapy    Length of service: 68149 - 45-50 minutes    Number of patients in attendance: 12    Referred by: Addictive Behavior Unit Treatment Team    Target symptoms: Substance Abuse    Patient's response to treatment: Active Listening and Self-disclosure    Progress toward goals: Progressing adequately    Interval History: Discussed how to respond when unjustly accused or when someone is being confrontational.    Diagnosis: Opiate use disorder, severe, in early remission    Plan: Continue treatment on ABU

## 2017-02-07 NOTE — PLAN OF CARE
02/07/17 1400   Activity/Group Therapy Checklist   Group Addiction Education  (Review and Discussion of treatment parameters and reasons behind rules.  Discussed using impulses and cravings, and strategies for distracting oneself from the craving.)   Attendance Attended   Follows Direction Followed directions   Group Interactions/Observations Interacted appropriately   Affect/Mood Range Normal range   Affect/Mood Display Appropriate   Goal Progression Progressing

## 2017-02-07 NOTE — PROGRESS NOTES
Group Psychotherapy (PhD/LCSW)    Site: [facility]    Clinical status of patient: Intensive Outpatient Program (IOP)    Date: 2/7/2017    Group Focus: Disease Model of Addiction      Length of service: 75189 - 45-50 minutes    Number of patients in attendance: 12    Referred by: Addictive Behavior Unit Treatment Team    Target symptoms: Substance Abuse    Patient's response to treatment: Active Listening and Self-disclosure    Progress toward goals: Progressing adequately    Interval History: Discussed the basic neuropsychological concepts of the Disease Model of Addiction and how they relate to the subjective phenomena of addiction (powerlessness; euphoric recall; chronicity; relapse; etc). Discussed the value of understanding the Disease Model for sustaining long-term sobriety.     Diagnosis: Opiate use disorder, severe, in early remission    Plan: Continue treatment on ABU

## 2017-02-07 NOTE — PROGRESS NOTES
Group Psychotherapy (PhD/LCSW)    Site: [facility]    Clinical status of patient: Intensive Outpatient Program (IOP)    Date: 2/7/2017    Group Focus: Acceptance and Commitment Therapy (ACT) Group Psychotherapy    Length of service: 46634 - 45-50 minutes    Number of patients in attendance: 19    Referred by: Addictive Behavior Unit Treatment Team    Target symptoms: Substance Abuse    Patient's response to treatment: Active Listening and Self-disclosure    Progress toward goals: Progressing adequately    Interval History: Session focus was Fusion and Defusion.  Patient was introduced to the concepts and provided with examples of exercises (i.e., Milk, milk, milk; labeling thoughts & feelings; using a song/different voice/slow speech).    Diagnosis: Opiate use disorder, severe, in early remission    Plan: Continue treatment on ABU

## 2017-02-07 NOTE — NURSING
Spoke with Veronica at [facility] to cancel prescription ordered by Dr. Doe per her request. Order has been cancelled.

## 2017-02-08 ENCOUNTER — HOSPITAL ENCOUNTER (OUTPATIENT)
Dept: PSYCHIATRY | Facility: HOSPITAL | Age: 32
Discharge: HOME | End: 2017-02-08
Attending: PSYCHIATRY & NEUROLOGY
Payer: COMMERCIAL

## 2017-02-08 VITALS — DIASTOLIC BLOOD PRESSURE: 95 MMHG | HEART RATE: 113 BPM | SYSTOLIC BLOOD PRESSURE: 150 MMHG | RESPIRATION RATE: 18 BRPM

## 2017-02-08 DIAGNOSIS — F41.9 ANXIETY: ICD-10-CM

## 2017-02-08 DIAGNOSIS — F98.8 ADD (ATTENTION DEFICIT DISORDER): ICD-10-CM

## 2017-02-08 DIAGNOSIS — F11.21 OPIOID DEPENDENCE IN REMISSION: Primary | ICD-10-CM

## 2017-02-08 LAB
AMPHET+METHAMPHET UR QL: NORMAL
BARBITURATES UR QL SCN>200 NG/ML: NEGATIVE
BENZODIAZ UR QL SCN>200 NG/ML: NEGATIVE
BREATH ALCOHOL: 0
BZE UR QL SCN: NEGATIVE
CANNABINOIDS UR QL SCN: NEGATIVE
CREAT UR-MCNC: 46 MG/DL
ETHANOL UR-MCNC: <10 MG/DL
METHADONE UR QL SCN>300 NG/ML: NEGATIVE
OPIATES UR QL SCN: NEGATIVE
PCP UR QL SCN>25 NG/ML: NEGATIVE
TOXICOLOGY INFORMATION: NORMAL

## 2017-02-08 PROCEDURE — 90853 GROUP PSYCHOTHERAPY: CPT

## 2017-02-08 PROCEDURE — 90853 GROUP PSYCHOTHERAPY: CPT | Performed by: SOCIAL WORKER

## 2017-02-08 PROCEDURE — 90853 GROUP PSYCHOTHERAPY: CPT | Mod: 59,,, | Performed by: PSYCHOLOGIST

## 2017-02-08 PROCEDURE — 90853 GROUP PSYCHOTHERAPY: CPT | Mod: ,,, | Performed by: PSYCHOLOGIST

## 2017-02-08 PROCEDURE — 99232 SBSQ HOSP IP/OBS MODERATE 35: CPT | Mod: ,,, | Performed by: PSYCHIATRY & NEUROLOGY

## 2017-02-08 PROCEDURE — 80307 DRUG TEST PRSMV CHEM ANLYZR: CPT

## 2017-02-08 NOTE — PLAN OF CARE
02/08/17 1000   Activity/Group Therapy Checklist   Group Addiction Education  (peer presented life story; feedback given. )   Attendance Attended   Follows Direction Followed directions   Group Interactions/Observations Interacted appropriately;Supportive   Affect/Mood Range Normal range   Affect/Mood Display Appropriate   Goal Progression Progressing

## 2017-02-08 NOTE — PROGRESS NOTES
Group Psychotherapy (PhD/LCSW)    Site: [facility]    Clinical status of patient: Intensive Outpatient Program (IOP)    Date: 2/8/2017    Group Focus: Stress Management    Length of service: 74323 - 45-50 minutes    Number of patients in attendance: 18    Referred by: Addictive Behavior Unit Treatment Team    Target symptoms: Substance Abuse    Patient's response to treatment: Active Listening and Self-disclosure    Progress toward goals: Progressing adequately    Interval History: Discussed the fight or fight response to stress.    Diagnosis: Opiate use disorder, severe, in early remission    Plan: Continue treatment on ABU

## 2017-02-08 NOTE — PATIENT CARE CONFERENCE
Opiate Use Disorder, severe, in early remission  Unspecified Anxiety Disorder      1. Pt is attending all groups    2. Pt is attending all meetings  3. Pt 's family is supportive of treatment    4. Pt has completed spiritual assessment    5. Pt will present life story    6. Pt will present Step One assignment    7. Pt is exploring issues related to relapse  prevention; spirituality; stress management; improved communication skills; assertiveness training; poor self-esteem; disease concepts; cross addictions; and, work related issues    8. D/C date: TBD          Staff discussed pt's aggressive behavior with another group member. Staffing discussed pt processing family issues with pt's father in regards to work. Staffing also discussed pt splitting with tx team and needing to bring in father for family day.    Problem: Opioid use d/o, severe, in early remission  Goal: Address in 12 step meetings and group and individual sessions    Objective Measure: participation in groups, self report, length of sobriety, and relapse prevention plan  Time: Prior to discharge    Progress: Pt is attending groups and sessions          Problem: Unspecified Anxiety Disorder  Goal: Address in 12 step meetings and group and individual sessions    Objective Measure: participation in groups, self report, length of sobriety, and relapse prevention plan  Time: Prior to discharge    Progress: Pt is attending groups and sessions            Staff members present:    MD Dr. Brook Decker MD Dr. Simpson, MD Dr. Correa, Ph.D.  Sumeet Kearns [facility]  Kellen Bray, [facility]  Tracey Chaudhry RN

## 2017-02-08 NOTE — PLAN OF CARE
02/08/17 1400   Activity/Group Therapy Checklist   Group Educational  (Andre HARVEY)   Attendance Attended   Follows Direction Followed directions   Group Interactions/Observations Interacted appropriately   Affect/Mood Range Normal range   Affect/Mood Display Appropriate   Goal Progression Progressing

## 2017-02-08 NOTE — PROGRESS NOTES
"PSYCHIATRY  ABU Partial Hospitalization  PROGRESS NOTE    Patient Name: Polo Kauffman  2017  11:27 AM  Start Date: 2017  : 1985    Status: Intensive Outpatient Program (IOP)  CC: Opioid Use Disorder    SUBJECTIVE:  Pt reports feeling "pretty good."  Continues to see ex-lover's dead face.  Pt discusses last week's emotional discussion with Dr. Davalos, and his frustration with how he perceived Dr. Davalos was "passing the buck" on him.  His last cravings were triggered by that meeting.    Declines to bring his dad for family meeting.  His dad is going to [facility] for co-dependent family program for five days.  Pt reports that he just did this with his parents in [facility].  He wants to have Crystal Cerna, his girlfriend, come instead.  He explains that he is "31 years old.  I'm an adult.  I should have my significant other."  Pt expresses concern that the meeting will not be "too remedial" and too time-consuming b/c she is "very busy and runs her own business."  Girlfriend's father is an alcoholic, and she also participates in [employer].        Medication Side Effects: denied  Cravings: no    Scheduled Meds:   Current Outpatient Prescriptions on File Prior to Encounter   Medication Sig Dispense Refill    abatacept (ORENCIA) 125 mg/mL Syrg Inject 125 mg into the skin every 7 days. 4 mL 5    acetaminophen (TYLENOL) 500 MG tablet Take 1 tablet (500 mg total) by mouth every 4 (four) hours as needed for Pain. Do not take more than 3000 mg (6 tablets) daily.  0    baclofen (LIORESAL) 10 MG tablet Take 1 tablet (10 mg total) by mouth 2 (two) times daily. 60 tablet 2    calcium-vitamin D (OSCAL) 250 (625)-125 mg-unit per tablet Take 1 tablet by mouth once daily.      gabapentin (NEURONTIN) 300 MG capsule Take 1 capsule (300 mg total) by mouth 3 (three) times daily. 90 capsule 2    ibuprofen (ADVIL,MOTRIN) 600 MG tablet Take 1 tablet (600 mg total) by mouth every 6 (six) hours as needed for Pain. 120 tablet 2 " "   LACTOBAC CMB #3/FOS/PANTETHINE (PROBIOTIC & ACIDOPHILUS ORAL) Take by mouth.      lisdexamfetamine (VYVANSE) 70 MG capsule Take 1 capsule (70 mg total) by mouth every morning. 30 capsule 0    mirtazapine (REMERON) 15 MG tablet       MULTIVIT-IRON-MIN-FOLIC ACID 3,500-18-0.4 UNIT-MG-MG ORAL CHEW Take by mouth.      prazosin (MINIPRESS) 1 MG Cap Take 1 capsule daily at bedtime, after 3 days increase to 2 capsules at bedtime. 60 capsule 1    sildenafil (VIAGRA) 100 MG tablet Take 1 tablet (100 mg total) by mouth daily as needed for Erectile Dysfunction. Take 1 hour before intercourse. 10 tablet 11     No current facility-administered medications on file prior to encounter.      ALLERGIES:  Review of patient's allergies indicates:  No Known Allergies    Psychiatric Review Of Systems - Is patient experiencing or having changes in:  sleep: yes, not tired during the day but sleep is intermittent through the night  appetite: denied  Weight: lost 50# since beginning rehab  energy/anergy: denied  interest/pleasure/anhedonia: denied  somatic symptoms: denied  libido: denied  anxiety/panic: yes, improved basal level of anxiety on Gabapentin  guilty/hopelessness: denied  concentration: denied  S.I.B.s/risky behavior: denied  Irritability: denied  Racing thoughts: denied  Impulsive behaviors: denied  Paranoia: denied  AVH: denied    Medical ROS:  See Dr. iVlchis's Admission Note of date 1/26/2017 for ROS.       OBJECTIVE:  Vitals:    02/08/17 1226   BP: (!) 150/95   Pulse: (!) 113   Resp: 18     Mental Status Exam:  Appearance: young white male wearing casual clothes  Behavior: cooperative, calm  Speech: normal rate, tone, and volume  Mood: "pretty good"  Affect: full  Thought Process: linear  Thought Perceptions: denied AVH  Thought Content: denied SI, HI; no delusions apparent  Sensorium: awake, alert  Attention/Concentration: intact to conversation  Orientation: person, place, time, and situation  Memory: intact " (recent, remote)  Abstraction: intact   Insight: fair  Judgment: fair      ASSESSMENT/PLAN:  Opiate Use Disorder, severe  Unspecified Anxiety Disorder  Attention Deficit Disorder  PTSD    Cluster B traits:  Narcissisism    Status:  Continue treatment on ABU    Plan:   1) Vital signs 3x a week  2) Daily breathalyzer and urine tox screen  3) ABU protocol    Patient's Intervention Response: accepting, reluctant    Medications:    -Vyvanse 50 mg PO daily for inattentive symptoms of ADHD.  Increase to 70 mg qd 2/1.  As of 2/8, pt still has not started 70 mg.  Instructed pt to bring bottle of unused Vyvanse for appropriate disposal.    -Gabapentin 600 mg PO BID for anxiety and for RA pain  -Zoloft 150 mg PO daily for anxiety and depression  - Remeron 15mg PO qhs  - Begin prazosin 1mg PO qhs, may titrate up  -Will refer for EMDR treatment at discharge    Additional Comments: none    Pt seen and discussed with Dr. Doe.       Alex Barrientos MD  [facility]  PGY-2  Pager:  [phone]      Attending Attestation:    I have independently evaluated the patient and discussed the case with the resident. I have reviewed this note and agree with its contents, as well as the assessment and plan.     Karla Doe MD

## 2017-02-08 NOTE — PROGRESS NOTES
Group Psychotherapy (PhD/LCSW)    Site: [facility]    Clinical status of patient: Intensive Outpatient Program (IOP)    Date: 2/8/2017    Group Focus: Psychodynamic Group Therapy        Length of service: 29534 - 45-50 minutes    Number of patients in attendance: 10    Referred by: Addictive Behavior Unit Treatment Team    Target symptoms: Substance Abuse    Patient's response to treatment: Active Listening, Feedback, and Self-disclosure    Progress toward goals: Progressing adequately    Interval History: Discussed the dynamics of personal growth and how 12-step principles facilitate same. Noted the way group members are being challenged to respond differently to problems with their significant others without using and how each time they do so represents an important step in their recovery.     Diagnosis: Opiate use disorder, severe, in early remission    Plan: Continue treatment on ABU

## 2017-02-09 ENCOUNTER — HOSPITAL ENCOUNTER (OUTPATIENT)
Dept: PSYCHIATRY | Facility: HOSPITAL | Age: 32
Discharge: HOME | End: 2017-02-09
Attending: PSYCHIATRY & NEUROLOGY
Payer: COMMERCIAL

## 2017-02-09 DIAGNOSIS — F11.21 OPIOID DEPENDENCE IN REMISSION: Primary | ICD-10-CM

## 2017-02-09 DIAGNOSIS — I73.00 RAYNAUD'S PHENOMENON WITHOUT GANGRENE: ICD-10-CM

## 2017-02-09 DIAGNOSIS — E29.1 HYPOGONADISM IN MALE: ICD-10-CM

## 2017-02-09 DIAGNOSIS — F98.8 ADD (ATTENTION DEFICIT DISORDER): ICD-10-CM

## 2017-02-09 DIAGNOSIS — F41.9 ANXIETY: ICD-10-CM

## 2017-02-09 DIAGNOSIS — M06.00 SERONEGATIVE RHEUMATOID ARTHRITIS: Chronic | ICD-10-CM

## 2017-02-09 LAB
AMPHET+METHAMPHET UR QL: NORMAL
BARBITURATES UR QL SCN>200 NG/ML: NEGATIVE
BENZODIAZ UR QL SCN>200 NG/ML: NEGATIVE
BREATH ALCOHOL: 0
BREATH ALCOHOL: 0.02
BZE UR QL SCN: NEGATIVE
CANNABINOIDS UR QL SCN: NEGATIVE
CREAT UR-MCNC: 36 MG/DL
ETHANOL UR-MCNC: <10 MG/DL
METHADONE UR QL SCN>300 NG/ML: NEGATIVE
OPIATES UR QL SCN: NEGATIVE
PCP UR QL SCN>25 NG/ML: NEGATIVE
TOXICOLOGY INFORMATION: NORMAL

## 2017-02-09 PROCEDURE — 80307 DRUG TEST PRSMV CHEM ANLYZR: CPT

## 2017-02-09 PROCEDURE — 90853 GROUP PSYCHOTHERAPY: CPT | Performed by: SOCIAL WORKER

## 2017-02-09 PROCEDURE — 90853 GROUP PSYCHOTHERAPY: CPT

## 2017-02-09 PROCEDURE — 90853 GROUP PSYCHOTHERAPY: CPT | Mod: 59,,, | Performed by: PSYCHOLOGIST

## 2017-02-09 NOTE — PLAN OF CARE
02/09/17 1000   Activity/Group Therapy Checklist   Group Addiction Education  (Peer completed life story presentation; Primary drives and imbalance in addiction)   Attendance Attended   Follows Direction Followed directions   Group Interactions/Observations Interacted appropriately;Supportive   Affect/Mood Range Normal range   Affect/Mood Display Appropriate   Goal Progression Progressing

## 2017-02-09 NOTE — PROGRESS NOTES
Group Psychotherapy (PhD/LCSW)    Site: [facility]    Clinical status of patient: Intensive Outpatient Program (IOP)    Date: 2/9/2017    Group Focus: Stress Management    Length of service: 54181 - 45-50 minutes    Number of patients in attendance: 19    Referred by: Addictive Behavior Unit Treatment Team    Target symptoms: Substance Abuse    Patient's response to treatment: Active Listening and Self-disclosure    Progress toward goals: Progressing adequately    Interval History: Discussed focusing on the positives in day to day life as opposed to the negatives.    Diagnosis: Opiate use disorder, severe, in early remission    Plan: Continue treatment on ABU

## 2017-02-09 NOTE — NURSING
Breathalyzed   pt. Again and he blew neg. (.000) for ETOH.  Inst. Dr. Doe of all results, stated we will wait for urine tox screen results  tomorrow.

## 2017-02-09 NOTE — NURSING
Pt. Was bestialized by the 2 [facility] nursing students this Am.  Both identified the reading to be .024.  Stated it took a while to register which is not unusual when it registers positive for alcohol.  Pt. Did no see the results.

## 2017-02-09 NOTE — PLAN OF CARE
02/09/17 1400   Activity/Group Therapy Checklist   Group Addiction Education  (Film about the founding of AA.)   Attendance Attended   Follows Direction Followed directions   Group Interactions/Observations Interacted appropriately   Affect/Mood Range Normal range   Affect/Mood Display Appropriate   Goal Progression Progressing

## 2017-02-10 ENCOUNTER — HOSPITAL ENCOUNTER (OUTPATIENT)
Dept: PSYCHIATRY | Facility: HOSPITAL | Age: 32
Discharge: HOME | End: 2017-02-10
Attending: PSYCHIATRY & NEUROLOGY
Payer: COMMERCIAL

## 2017-02-10 VITALS — HEART RATE: 85 BPM | DIASTOLIC BLOOD PRESSURE: 67 MMHG | SYSTOLIC BLOOD PRESSURE: 127 MMHG | RESPIRATION RATE: 16 BRPM

## 2017-02-10 DIAGNOSIS — F98.8 ADD (ATTENTION DEFICIT DISORDER): ICD-10-CM

## 2017-02-10 DIAGNOSIS — F41.9 ANXIETY: ICD-10-CM

## 2017-02-10 DIAGNOSIS — F11.21 OPIOID DEPENDENCE IN REMISSION: Primary | ICD-10-CM

## 2017-02-10 LAB
AMPHET+METHAMPHET UR QL: NORMAL
BARBITURATES UR QL SCN>200 NG/ML: NEGATIVE
BENZODIAZ UR QL SCN>200 NG/ML: NEGATIVE
BREATH ALCOHOL: 0
BZE UR QL SCN: NEGATIVE
CANNABINOIDS UR QL SCN: NEGATIVE
CREAT UR-MCNC: 38 MG/DL
ETHANOL UR-MCNC: <10 MG/DL
METHADONE UR QL SCN>300 NG/ML: NEGATIVE
OPIATES UR QL SCN: NEGATIVE
PCP UR QL SCN>25 NG/ML: NEGATIVE
TOXICOLOGY INFORMATION: NORMAL

## 2017-02-10 PROCEDURE — 99232 SBSQ HOSP IP/OBS MODERATE 35: CPT | Mod: ,,, | Performed by: PSYCHIATRY & NEUROLOGY

## 2017-02-10 PROCEDURE — 90853 GROUP PSYCHOTHERAPY: CPT | Performed by: SOCIAL WORKER

## 2017-02-10 PROCEDURE — 90853 GROUP PSYCHOTHERAPY: CPT

## 2017-02-10 PROCEDURE — 90853 GROUP PSYCHOTHERAPY: CPT | Mod: ,,, | Performed by: PSYCHOLOGIST

## 2017-02-10 PROCEDURE — 80307 DRUG TEST PRSMV CHEM ANLYZR: CPT

## 2017-02-10 NOTE — PLAN OF CARE
02/10/17 1000   Activity/Group Therapy Checklist   Group Addiction Education  (Peer presented life story; feedback given. )   Attendance Attended   Follows Direction Followed directions   Group Interactions/Observations Interacted appropriately   Affect/Mood Range Normal range   Affect/Mood Display Appropriate   Goal Progression Progressing

## 2017-02-10 NOTE — PROGRESS NOTES
"PSYCHIATRY  ABU Partial Hospitalization  PROGRESS NOTE    Patient Name: Polo Kauffman  2/10/2017  11:27 AM  Start Date: 2017  : 1985    Status: Intensive Outpatient Program (IOP)  CC: Opioid Use Disorder    SUBJECTIVE:  Pt was "caught off guard" that SMITH Fay called pt's parents and told them that pt did not want his parents to come for family visit.  Pt reports being "in a good place."  Pt feels that he does not have enough time with sessions where subjects are brought up but not resolved.  States that his dad has been using Crystal as a "middle man" to get to pt indirectly.  Complains that dad "strong arms" the conversation towards "Brewer recovery" and ruins the mood at family gatherings.  Pt feels like he's doing good, but everybody is on "crazy Brewer alert ... walking on eggshells."  Discussed setting limits, walking away from conversations that bother him.  Pt requested names of relationships counselors for him and Crystal.  Pt does not mesh with Dr. Davalos and was very put off by "it's above my pay grade" comment.  Pt asked if "that thing with Cara" hurt him and he insisted "it was defensive, she came up to me."  Assured pt that did not have anything to do with his sobriety.             Medication Side Effects: denied  Cravings: no    Scheduled Meds:   Current Outpatient Prescriptions on File Prior to Encounter   Medication Sig Dispense Refill    abatacept (ORENCIA) 125 mg/mL Syrg Inject 125 mg into the skin every 7 days. 4 mL 5    acetaminophen (TYLENOL) 500 MG tablet Take 1 tablet (500 mg total) by mouth every 4 (four) hours as needed for Pain. Do not take more than 3000 mg (6 tablets) daily.  0    baclofen (LIORESAL) 10 MG tablet Take 1 tablet (10 mg total) by mouth 2 (two) times daily. 60 tablet 2    calcium-vitamin D (OSCAL) 250 (625)-125 mg-unit per tablet Take 1 tablet by mouth once daily.      gabapentin (NEURONTIN) 300 MG capsule Take 1 capsule (300 mg total) by mouth 3 " "(three) times daily. 90 capsule 2    ibuprofen (ADVIL,MOTRIN) 600 MG tablet Take 1 tablet (600 mg total) by mouth every 6 (six) hours as needed for Pain. 120 tablet 2    LACTOBAC CMB #3/FOS/PANTETHINE (PROBIOTIC & ACIDOPHILUS ORAL) Take by mouth.      lisdexamfetamine (VYVANSE) 70 MG capsule Take 1 capsule (70 mg total) by mouth every morning. 30 capsule 0    mirtazapine (REMERON) 15 MG tablet       MULTIVIT-IRON-MIN-FOLIC ACID 3,500-18-0.4 UNIT-MG-MG ORAL CHEW Take by mouth.      prazosin (MINIPRESS) 1 MG Cap Take 1 capsule daily at bedtime, after 3 days increase to 2 capsules at bedtime. 60 capsule 1    sildenafil (VIAGRA) 100 MG tablet Take 1 tablet (100 mg total) by mouth daily as needed for Erectile Dysfunction. Take 1 hour before intercourse. 10 tablet 11     No current facility-administered medications on file prior to encounter.      ALLERGIES:  No Known Allergies    Psychiatric Review Of Systems - Is patient experiencing or having changes in:  sleep: no   appetite: denied  Weight: lost 50# since beginning rehab  energy/anergy: denied  interest/pleasure/anhedonia: denied  somatic symptoms: denied  libido: denied  anxiety/panic: yes, improved basal level of anxiety on Gabapentin  guilty/hopelessness: denied  concentration: denied  S.I.B.s/risky behavior: denied  Irritability: denied  Racing thoughts: denied  Impulsive behaviors: denied  Paranoia: denied  AVH: denied    Medical ROS:  See Dr. Vilchis's Admission Note of date 1/26/2017 for ROS.       OBJECTIVE:  Vitals:    02/10/17 1245   BP: 127/67   Pulse: 85   Resp: 16     Mental Status Exam:  Appearance: young white male wearing casual clothes  Behavior: cooperative, calm  Speech: normal rate, tone, and volume  Mood: "doing good"  Affect: full  Thought Process: linear  Thought Perceptions: denied AVH  Thought Content: denied SI, HI; no delusions apparent  Sensorium: awake, alert  Attention/Concentration: intact to conversation  Orientation: person, " "place, time, and situation  Memory: intact (recent, remote)  Abstraction: intact   Insight: fair  Judgment: fair      ASSESSMENT/PLAN:  Opiate Use Disorder, severe  Unspecified Anxiety Disorder  Attention Deficit Disorder  PTSD    Cluster B traits:  Narcissisism    Status:  Continue treatment on ABU    Plan:   1) Vital signs 3x a week  2) Daily breathalyzer and urine tox screen  3) ABU protocol  4) Pt "splitting" staff.  Will only see together with attending M-W-F.      Patient's Intervention Response: accepting, reluctant    Medications:    - Vyvanse 50 mg PO daily for inattentive symptoms of ADHD.  Increase to 70 mg qd 2/1.  As of 2/8, pt still has not started 70 mg.  Instructed pt to bring bottle of unused Vyvanse for appropriate disposal.    - Gabapentin 600 mg PO BID for anxiety and for RA pain  - Zoloft 150 mg PO daily for anxiety and depression  - Remeron 15mg PO qhs  - Begin prazosin 1mg PO qhs, may titrate up  - Will refer for EMDR treatment at discharge:  Jessica Moya  - At pt's request, Dr. Doe suggested [facility] or [facility] for relationships counseling   - Family meeting with parents in two weeks, Feb 21, 2017.    Additional Comments: none    Pt seen and discussed with Dr. Doe.       Aelx Barrientos MD  [facility]  PGY-2  Pager:  [phone]        "

## 2017-02-10 NOTE — PROGRESS NOTES
Group Psychotherapy (PhD/LCSW)    Site: [facility]    Clinical status of patient: Intensive Outpatient Program (IOP)    Date: 2/10/2017    Group Focus: Psychodynamic Group Psychotherapy    Length of service: 40591 - 45-50 minutes    Number of patients in attendance: 11    Referred by: Addictive Behavior Unit Treatment Team    Target symptoms: Substance Abuse    Patient's response to treatment: Active Listening and Self-disclosure    Progress toward goals: Progressing adequately    Interval History: Shared his story with new group member. Discussed how he will be safe this weekend.    Diagnosis: Opiate use disorder, severe, in early remission    Plan: Continue treatment on ABU

## 2017-02-13 ENCOUNTER — CLINICAL SUPPORT (OUTPATIENT)
Dept: INFECTIOUS DISEASES | Facility: CLINIC | Age: 32
End: 2017-02-13
Payer: COMMERCIAL

## 2017-02-13 ENCOUNTER — HOSPITAL ENCOUNTER (OUTPATIENT)
Dept: PSYCHIATRY | Facility: HOSPITAL | Age: 32
Discharge: HOME | End: 2017-02-13
Attending: PSYCHIATRY & NEUROLOGY
Payer: COMMERCIAL

## 2017-02-13 VITALS — DIASTOLIC BLOOD PRESSURE: 94 MMHG | RESPIRATION RATE: 18 BRPM | SYSTOLIC BLOOD PRESSURE: 164 MMHG | HEART RATE: 123 BPM

## 2017-02-13 DIAGNOSIS — F11.21 OPIOID DEPENDENCE IN REMISSION: Primary | ICD-10-CM

## 2017-02-13 DIAGNOSIS — F98.8 ADD (ATTENTION DEFICIT DISORDER): ICD-10-CM

## 2017-02-13 DIAGNOSIS — F41.9 ANXIETY: ICD-10-CM

## 2017-02-13 DIAGNOSIS — F90.9 ATTENTION DEFICIT HYPERACTIVITY DISORDER (ADHD), UNSPECIFIED ADHD TYPE: Primary | ICD-10-CM

## 2017-02-13 LAB
AMPHET+METHAMPHET UR QL: NEGATIVE
BARBITURATES UR QL SCN>200 NG/ML: NEGATIVE
BENZODIAZ UR QL SCN>200 NG/ML: NEGATIVE
BREATH ALCOHOL: 0
BZE UR QL SCN: NEGATIVE
CANNABINOIDS UR QL SCN: NEGATIVE
CREAT UR-MCNC: 26 MG/DL
ETHANOL UR-MCNC: <10 MG/DL
METHADONE UR QL SCN>300 NG/ML: NEGATIVE
OPIATES UR QL SCN: NEGATIVE
PCP UR QL SCN>25 NG/ML: NEGATIVE
TOXICOLOGY INFORMATION: NORMAL

## 2017-02-13 PROCEDURE — 96372 THER/PROPH/DIAG INJ SC/IM: CPT | Mod: S$GLB,,, | Performed by: INTERNAL MEDICINE

## 2017-02-13 PROCEDURE — 90853 GROUP PSYCHOTHERAPY: CPT | Performed by: SOCIAL WORKER

## 2017-02-13 PROCEDURE — 90853 GROUP PSYCHOTHERAPY: CPT | Mod: ,,, | Performed by: PSYCHOLOGIST

## 2017-02-13 PROCEDURE — 99999 PR PBB SHADOW E&M-EST. PATIENT-LVL I: CPT | Mod: PBBFAC,,,

## 2017-02-13 PROCEDURE — 90853 GROUP PSYCHOTHERAPY: CPT

## 2017-02-13 PROCEDURE — 80307 DRUG TEST PRSMV CHEM ANLYZR: CPT

## 2017-02-13 PROCEDURE — 99231 SBSQ HOSP IP/OBS SF/LOW 25: CPT | Mod: ,,, | Performed by: PSYCHIATRY & NEUROLOGY

## 2017-02-13 NOTE — PROGRESS NOTES
"PSYCHIATRY  ABU Partial Hospitalization  PROGRESS NOTE    Patient Name: Polo Kauffman  2017  11:27 AM  Start Date: 2017  : 1985    Status: Intensive Outpatient Program (IOP)  CC: Opioid Use Disorder    SUBJECTIVE:  Pt had a good weekend.  Pt has met Dr. Rosado last time he was hospitalized for seizures.  Got a puppy, Mutty, a mini Burk doodle and went to [facility] for the weekend.  Puppy has brought "swati and happiness."  Still dealing with suicide but no nightmares about the suicide this weekend.  States that he had been using to cover up the rage and hate engendered by his ex-girlfriend's suicide on 2016.  Pt lives with current girlfriend, Crystal.  States that she is "good and doesn't put up with any shit."  Pt states that she has dealt with addiction in her life b/c of her father but "not herself."  Denies cravings and being in tempting situations this weekend.  Discussed alternative treatments to RA pain:  acupuncture, hypnosis, coping mechanisms.  Mood is "good."  Eating and sleeping well.  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              Medication Side Effects: denied  Cravings: no    Scheduled Meds:   Current Outpatient Prescriptions on File Prior to Encounter   Medication Sig Dispense Refill    abatacept (ORENCIA) 125 mg/mL Syrg Inject 125 mg into the skin every 7 days. 4 mL 5    acetaminophen (TYLENOL) 500 MG tablet Take 1 tablet (500 mg total) by mouth every 4 (four) hours as needed for Pain. Do not take more than 3000 mg (6 tablets) daily.  0    baclofen (LIORESAL) 10 MG tablet Take 1 tablet (10 mg total) by mouth 2 (two) times daily. 60 tablet 2    calcium-vitamin D (OSCAL) 250 (625)-125 mg-unit per tablet Take 1 tablet by mouth once daily.      gabapentin (NEURONTIN) 300 MG capsule Take 1 capsule (300 mg total) by mouth 3 (three) times daily. 90 capsule 2    ibuprofen (ADVIL,MOTRIN) 600 MG tablet Take 1 tablet (600 mg " "total) by mouth every 6 (six) hours as needed for Pain. 120 tablet 2    LACTOBAC CMB #3/FOS/PANTETHINE (PROBIOTIC & ACIDOPHILUS ORAL) Take by mouth.      lisdexamfetamine (VYVANSE) 70 MG capsule Take 1 capsule (70 mg total) by mouth every morning. 30 capsule 0    mirtazapine (REMERON) 15 MG tablet       MULTIVIT-IRON-MIN-FOLIC ACID 3,500-18-0.4 UNIT-MG-MG ORAL CHEW Take by mouth.      prazosin (MINIPRESS) 1 MG Cap Take 1 capsule daily at bedtime, after 3 days increase to 2 capsules at bedtime. 60 capsule 1    sildenafil (VIAGRA) 100 MG tablet Take 1 tablet (100 mg total) by mouth daily as needed for Erectile Dysfunction. Take 1 hour before intercourse. 10 tablet 11     No current facility-administered medications on file prior to encounter.      ALLERGIES:  No Known Allergies    Psychiatric Review Of Systems - Is patient experiencing or having changes in:  sleep: no   appetite: denied  Weight: lost 50# since beginning rehab  energy/anergy: denied  interest/pleasure/anhedonia: denied  somatic symptoms: denied  libido: denied  anxiety/panic: no, improved basal level of anxiety on Gabapentin  guilty/hopelessness: denied  concentration: denied  S.I.B.s/risky behavior: denied  Irritability: denied  Racing thoughts: denied  Impulsive behaviors: denied  Paranoia: denied  AVH: denied    Medical ROS:  See Dr. Vilchis's Admission Note of date 1/26/2017 for ROS.     OBJECTIVE:  There were no vitals filed for this visit.    Mental Status Exam:  Appearance: young white male wearing casual clothes  Behavior: cooperative, calm  Speech: normal rate, tone, and volume  Mood: "good"  Affect: full  Thought Process: linear  Thought Perceptions: denied AVH  Thought Content: denied SI, HI; no delusions apparent  Sensorium: awake, alert  Attention/Concentration: intact to conversation  Orientation: person, place, time, and situation  Memory: intact (recent, remote)  Abstraction: intact   Insight: fair  Judgment: " "fair      ASSESSMENT/PLAN:  Opiate Use Disorder, severe  Unspecified Anxiety Disorder  Attention Deficit Disorder  PTSD    Cluster B traits:  Narcissisism    Status:  Continue treatment on ABU    Plan:   1) Vital signs 3x a week  2) Daily breathalyzer and urine tox screen  3) ABU protocol  4) Pt "splitting" staff.  Will only see together with attending M-W-F.      Patient's Intervention Response: accepting, reluctant    Medications:    - Vyvanse 50 mg PO daily for inattentive symptoms of ADHD.  Increase to 70 mg qd 2/1.  As of 2/13, pt still has not started 70 mg.  Instructed pt to bring bottle of unused Vyvanse for appropriate disposal.    - Gabapentin 600 mg PO BID for anxiety and for RA pain  - Zoloft 150 mg PO daily for anxiety and depression  - Remeron 15mg PO qhs  - Vivitrol shot 2/13/17  - Ibuprofen 800 mg q6h PRN for pain  - Begin prazosin 1mg PO qhs, may titrate up  - Will refer for EMDR treatment at discharge:  Jessica Moya  - At pt's request, Dr. Doe suggested [facility] or [facility] for relationships counseling.  Pt does not want Dr. Davalos.  - Family meeting with parents in two weeks, Feb 21, 2017.    Additional Comments: none    Pt seen and discussed with Dr. Rosado.       Alex Barrientos MD  [facility]  PGY-2  Pager:  [phone]        "

## 2017-02-13 NOTE — PLAN OF CARE
02/13/17 1000   Activity/Group Therapy Checklist   Group Addiction Education  (Group discussed and responded to peer relapse prevention plan.)   Attendance Attended   Follows Direction Followed directions   Group Interactions/Observations Interacted appropriately;Sharing   Affect/Mood Range Normal range   Affect/Mood Display Appropriate   Goal Progression Progressing

## 2017-02-13 NOTE — PROGRESS NOTES
Group Psychotherapy (PhD/LCSW)    Site: [facility]    Clinical status of patient: Intensive Outpatient Program (IOP)    Date: 2/13/2017    Group Focus: Psychodynamic Group Psychotherapy    Length of service: 59058 - 45-50 minutes    Number of patients in attendance: 12    Referred by: Addictive Behavior Unit Treatment Team    Target symptoms: Substance Abuse    Patient's response to treatment: Active Listening and Self-disclosure    Progress toward goals: Progressing adequately    Interval History: Discussed father always being there to get him out of trouble.    Diagnosis: Opiate use disorder, severe, in early remission    Plan: Continue treatment on ABU

## 2017-02-13 NOTE — PLAN OF CARE
02/10/17 1400   Activity/Group Therapy Checklist   Group Educational  (defenses      )   Attendance Attended   Follows Direction Followed directions   Group Interactions/Observations Interacted appropriately   Affect/Mood Range Normal range   Affect/Mood Display Appropriate   Goal Progression Progressing

## 2017-02-14 ENCOUNTER — HOSPITAL ENCOUNTER (OUTPATIENT)
Dept: PSYCHIATRY | Facility: HOSPITAL | Age: 32
Discharge: HOME | End: 2017-02-14
Attending: PSYCHIATRY & NEUROLOGY
Payer: COMMERCIAL

## 2017-02-14 DIAGNOSIS — F41.9 ANXIETY: ICD-10-CM

## 2017-02-14 DIAGNOSIS — F98.8 ADD (ATTENTION DEFICIT DISORDER): ICD-10-CM

## 2017-02-14 DIAGNOSIS — F11.21 OPIOID DEPENDENCE IN REMISSION: Primary | ICD-10-CM

## 2017-02-14 LAB
AMPHET+METHAMPHET UR QL: NEGATIVE
BARBITURATES UR QL SCN>200 NG/ML: NEGATIVE
BENZODIAZ UR QL SCN>200 NG/ML: NEGATIVE
BREATH ALCOHOL: 0
BZE UR QL SCN: NEGATIVE
CANNABINOIDS UR QL SCN: NEGATIVE
CREAT UR-MCNC: 14 MG/DL
ETHANOL UR-MCNC: <10 MG/DL
METHADONE UR QL SCN>300 NG/ML: NEGATIVE
OPIATES UR QL SCN: NEGATIVE
PCP UR QL SCN>25 NG/ML: NEGATIVE
TOXICOLOGY INFORMATION: ABNORMAL

## 2017-02-14 PROCEDURE — 80307 DRUG TEST PRSMV CHEM ANLYZR: CPT

## 2017-02-14 PROCEDURE — 90853 GROUP PSYCHOTHERAPY: CPT | Mod: ,,, | Performed by: PSYCHOLOGIST

## 2017-02-14 PROCEDURE — 90853 GROUP PSYCHOTHERAPY: CPT | Performed by: SOCIAL WORKER

## 2017-02-14 PROCEDURE — 90853 GROUP PSYCHOTHERAPY: CPT | Mod: 59,,, | Performed by: PSYCHOLOGIST

## 2017-02-14 PROCEDURE — 90853 GROUP PSYCHOTHERAPY: CPT

## 2017-02-14 NOTE — PROGRESS NOTES
Group Psychotherapy (PhD/LCSW)    Site: [facility]    Clinical status of patient: Intensive Outpatient Program (IOP)    Date: 2/14/2017    Group Focus: Psychodynamic Group Psychotherapy    Length of service: 56583 - 45-50 minutes    Number of patients in attendance: 12    Referred by: Addictive Behavior Unit Treatment Team    Target symptoms: Substance Abuse    Patient's response to treatment: Active Listening and Self-disclosure    Progress toward goals: Progressing adequately    Interval History: Discussed getting Vivitrol shot yesterday. Plans to do this for a year.    Diagnosis: Opiate use disorder, severe, in early remission    Plan: Continue treatment on ABU

## 2017-02-14 NOTE — PLAN OF CARE
02/13/17 1400   Activity/Group Therapy Checklist   Group Educational  (defenses      )   Attendance Attended   Follows Direction Followed directions   Group Interactions/Observations Interacted appropriately   Affect/Mood Range Normal range   Affect/Mood Display Appropriate   Goal Progression Progressing

## 2017-02-14 NOTE — PLAN OF CARE
02/14/17 1000   Activity/Group Therapy Checklist   Group Educational  (codependency     )   Attendance Attended   Follows Direction Followed directions   Group Interactions/Observations Interacted appropriately   Affect/Mood Range Normal range   Affect/Mood Display Appropriate   Goal Progression Progressing

## 2017-02-15 ENCOUNTER — HOSPITAL ENCOUNTER (OUTPATIENT)
Dept: PSYCHIATRY | Facility: HOSPITAL | Age: 32
Discharge: HOME | End: 2017-02-15
Attending: PSYCHIATRY & NEUROLOGY
Payer: COMMERCIAL

## 2017-02-15 VITALS — SYSTOLIC BLOOD PRESSURE: 154 MMHG | DIASTOLIC BLOOD PRESSURE: 93 MMHG | RESPIRATION RATE: 18 BRPM | HEART RATE: 115 BPM

## 2017-02-15 DIAGNOSIS — F41.9 ANXIETY: ICD-10-CM

## 2017-02-15 DIAGNOSIS — F98.8 ADD (ATTENTION DEFICIT DISORDER): ICD-10-CM

## 2017-02-15 DIAGNOSIS — F11.21 OPIOID DEPENDENCE IN REMISSION: ICD-10-CM

## 2017-02-15 LAB
AMPHET+METHAMPHET UR QL: NEGATIVE
BARBITURATES UR QL SCN>200 NG/ML: NEGATIVE
BENZODIAZ UR QL SCN>200 NG/ML: NEGATIVE
BREATH ALCOHOL: 0
BZE UR QL SCN: NEGATIVE
CANNABINOIDS UR QL SCN: NEGATIVE
CREAT UR-MCNC: 21 MG/DL
ETHANOL UR-MCNC: <10 MG/DL
METHADONE UR QL SCN>300 NG/ML: NEGATIVE
OPIATES UR QL SCN: NEGATIVE
PCP UR QL SCN>25 NG/ML: NEGATIVE
TOXICOLOGY INFORMATION: ABNORMAL

## 2017-02-15 PROCEDURE — 80307 DRUG TEST PRSMV CHEM ANLYZR: CPT

## 2017-02-15 PROCEDURE — 99232 SBSQ HOSP IP/OBS MODERATE 35: CPT | Mod: ,,, | Performed by: INTERNAL MEDICINE

## 2017-02-15 NOTE — PROGRESS NOTES
PSYCHIATRY  ABU Partial Hospitalization  PROGRESS NOTE    Patient Name: Polo Kauffman  2/15/2017  11:27 AM  Start Date: 2017  : 1985    Status: Intensive Outpatient Program (IOP)  CC: Opioid Use Disorder    SUBJECTIVE:  Pt states that today he doesn't feel good because of the change in weather. He describes feeling anxious about the cold, but doesn't describe a particularly good reason why this would be, and says he is considering leaving early because he doesn't feel well. He reports improvement in nightmares as well as day time flashbacks, which have been reduced to about once a week. He attributes this both to the prazosin as well as the fact that he is talking about what happened in group. We discussed a bit more his reluctance to have his father come to the family meeting. He explains that his family just finished going to 10 day program with him, and feels that having his father come in for an hour won't accomplish much. He states that he feels that the group leaves loose ends, and that parents leave more confused than when they arrived. Despite this, we encouraged him to consider having his father attend, as well as staying through the day today. He denies any worsening depression. No changes in depression, energy or appetite.             Medication Side Effects: denied  Cravings: no    Scheduled Meds:   Current Outpatient Prescriptions on File Prior to Encounter   Medication Sig Dispense Refill    abatacept (ORENCIA) 125 mg/mL Syrg Inject 125 mg into the skin every 7 days. 4 mL 5    acetaminophen (TYLENOL) 500 MG tablet Take 1 tablet (500 mg total) by mouth every 4 (four) hours as needed for Pain. Do not take more than 3000 mg (6 tablets) daily.  0    baclofen (LIORESAL) 10 MG tablet Take 1 tablet (10 mg total) by mouth 2 (two) times daily. 60 tablet 2    calcium-vitamin D (OSCAL) 250 (625)-125 mg-unit per tablet Take 1 tablet by mouth once daily.      gabapentin (NEURONTIN) 300 MG  capsule Take 1 capsule (300 mg total) by mouth 3 (three) times daily. 90 capsule 2    ibuprofen (ADVIL,MOTRIN) 600 MG tablet Take 1 tablet (600 mg total) by mouth every 6 (six) hours as needed for Pain. 120 tablet 2    LACTOBAC CMB #3/FOS/PANTETHINE (PROBIOTIC & ACIDOPHILUS ORAL) Take by mouth.      lisdexamfetamine (VYVANSE) 70 MG capsule Take 1 capsule (70 mg total) by mouth every morning. 30 capsule 0    mirtazapine (REMERON) 15 MG tablet       MULTIVIT-IRON-MIN-FOLIC ACID 3,500-18-0.4 UNIT-MG-MG ORAL CHEW Take by mouth.      naltrexone microspheres 380 mg SSRR kit Inject 1 each (380 mg total) into the muscle once. 1 each 0    prazosin (MINIPRESS) 1 MG Cap Take 1 capsule daily at bedtime, after 3 days increase to 2 capsules at bedtime. 60 capsule 1    sildenafil (VIAGRA) 100 MG tablet Take 1 tablet (100 mg total) by mouth daily as needed for Erectile Dysfunction. Take 1 hour before intercourse. 10 tablet 11     No current facility-administered medications on file prior to encounter.      ALLERGIES:  No Known Allergies    Psychiatric Review Of Systems - Is patient experiencing or having changes in:  sleep: no   appetite: denied  Weight: lost 50# since beginning rehab  energy/anergy: denied  interest/pleasure/anhedonia: denied  somatic symptoms: denied  libido: denied  anxiety/panic: no, improved basal level of anxiety on Gabapentin  guilty/hopelessness: denied  concentration: denied  S.I.B.s/risky behavior: denied  Irritability: denied  Racing thoughts: denied  Impulsive behaviors: denied  Paranoia: denied  AVH: denied    Medical ROS:  See Dr. Vilchis's Admission Note of date 1/26/2017 for ROS.     OBJECTIVE:  There were no vitals filed for this visit.    Mental Status Exam:  Appearance: young white male wearing casual clothes  Behavior: cooperative, calm  Speech: normal rate, tone, and volume  Mood: ok, but not feeling well  Affect: full  Thought Process: linear  Thought Perceptions: denied AVH  Thought  "Content: denied SI, HI; no delusions apparent  Sensorium: awake, alert  Attention/Concentration: intact to conversation  Orientation: person, place, time, and situation  Memory: intact (recent, remote)  Abstraction: intact   Insight: fair  Judgment: fair      ASSESSMENT/PLAN:  Opiate Use Disorder, severe  Unspecified Anxiety Disorder  Attention Deficit Disorder  PTSD    Cluster B traits:  Narcissisism    Status:  Continue treatment on ABU    Plan:   1) Vital signs 3x a week  2) Daily breathalyzer and urine tox screen  3) ABU protocol  4) Pt "splitting" staff.  Will only see together with attending M-W-F.      Patient's Intervention Response: accepting, reluctant    Medications:    - Vyvanse 50 mg PO daily for inattentive symptoms of ADHD.  Increase to 70 mg qd 2/1.  Pt has filled his prescription for 70mg of vyvanse. He did not remember to bring in his bottle of 50s today, but was reminded to bring it tomorrow.  - Gabapentin 600 mg PO BID for anxiety and for RA pain  - Zoloft 150 mg PO daily for anxiety and depression  - Remeron 15mg PO qhs  - Vivitrol shot 2/13/17  - Ibuprofen 800 mg q6h PRN for pain  - Currently taking prazosin 1mg PO qhs, recommended increase to 2mg qhs  - Will refer for EMDR treatment at discharge:  [facility]  - At pt's request, Dr. Doe suggested [facility] for relationships counseling.  Pt does not want Dr. Davalos.  - Family meeting with parents in two weeks, Feb 21, 2017.    Additional Comments: none    Pt seen and discussed with Dr. Maria G Price MD MPH  [facility]  [facility]  "

## 2017-02-15 NOTE — PROGRESS NOTES
Patient attempted to attend first group of the day but was feeling ill, unable to focus or stay awake.  He was dismissed to go home after his morning screenings.  No charges for therapy.

## 2017-02-15 NOTE — PATIENT CARE CONFERENCE
Opiate Use Disorder, severe, in early remission  Unspecified Anxiety Disorder      1. Pt is attending all groups    2. Pt is attending all meetings  3. Pt 's family is supportive of treatment    4. Pt has completed spiritual assessment    5. Pt will present life story    6. Pt will present Step One assignment    7. Pt is exploring issues related to relapse  prevention; spirituality; stress management; improved communication skills; assertiveness training; poor self-esteem; disease concepts; cross addictions; and, work related issues    8. D/C date: TBD          Staff discussed pt receiving vivitrol shot on Monday. Staffing also discussed pt's entitled angry behavior and needing to bring in father and girlfriend for family day.    Problem: Opioid use d/o, severe, in early remission  Goal: Address in 12 step meetings and group and individual sessions    Objective Measure: participation in groups, self report, length of sobriety, and relapse prevention plan  Time: Prior to discharge    Progress: Pt is attending groups and sessions          Problem: Unspecified Anxiety Disorder  Goal: Address in 12 step meetings and group and individual sessions    Objective Measure: participation in groups, self report, length of sobriety, and relapse prevention plan  Time: Prior to discharge    Progress: Pt is attending groups and sessions            Staff members present:    MD Dr. Floyd Decker MD Dr. Correa, Ph.D.  MACK Cervantes LMSW Cherie Moragass, RN

## 2017-02-16 ENCOUNTER — HOSPITAL ENCOUNTER (OUTPATIENT)
Dept: PSYCHIATRY | Facility: HOSPITAL | Age: 32
Discharge: HOME | End: 2017-02-16
Attending: PSYCHIATRY & NEUROLOGY
Payer: COMMERCIAL

## 2017-02-16 DIAGNOSIS — F98.8 ADD (ATTENTION DEFICIT DISORDER): ICD-10-CM

## 2017-02-16 DIAGNOSIS — F11.21 OPIOID DEPENDENCE IN REMISSION: Primary | ICD-10-CM

## 2017-02-16 DIAGNOSIS — F41.9 ANXIETY: ICD-10-CM

## 2017-02-16 LAB
AMPHET+METHAMPHET UR QL: NEGATIVE
BARBITURATES UR QL SCN>200 NG/ML: NEGATIVE
BENZODIAZ UR QL SCN>200 NG/ML: NEGATIVE
BREATH ALCOHOL: 0
BZE UR QL SCN: NEGATIVE
CANNABINOIDS UR QL SCN: NEGATIVE
CREAT UR-MCNC: 32 MG/DL
ETHANOL UR-MCNC: <10 MG/DL
METHADONE UR QL SCN>300 NG/ML: NEGATIVE
OPIATES UR QL SCN: NEGATIVE
PCP UR QL SCN>25 NG/ML: NEGATIVE
TOXICOLOGY INFORMATION: NORMAL

## 2017-02-16 PROCEDURE — 90853 GROUP PSYCHOTHERAPY: CPT | Performed by: SOCIAL WORKER

## 2017-02-16 PROCEDURE — 80307 DRUG TEST PRSMV CHEM ANLYZR: CPT

## 2017-02-16 PROCEDURE — 90853 GROUP PSYCHOTHERAPY: CPT | Mod: 59,,, | Performed by: PSYCHOLOGIST

## 2017-02-16 PROCEDURE — 90853 GROUP PSYCHOTHERAPY: CPT

## 2017-02-16 NOTE — PROGRESS NOTES
Group Psychotherapy (PhD/LCSW)    Site: [facility]    Clinical status of patient: Intensive Outpatient Program (IOP)    Date: 2/16/2017    Group Focus: Psychodynamic Group Psychotherapy    Length of service: 13755 - 45-50 minutes    Number of patients in attendance: 12    Referred by: Addictive Behavior Unit Treatment Team    Target symptoms: Substance Abuse    Patient's response to treatment: Active Listening and Self-disclosure    Progress toward goals: Progressing adequately    Interval History: Discussed social anxiety.    Diagnosis: Opiate use disorder, severe, in early remission    Plan: Continue treatment on ABU

## 2017-02-16 NOTE — PROGRESS NOTES
Group Psychotherapy (PhD/LCSW)    Site: [facility]    Clinical status of patient: Intensive Outpatient Program (IOP)    Date: 2/16/2017    Group Focus: Stress Management    Length of service: 69106 - 45-50 minutes    Number of patients in attendance: 19    Referred by: Addictive Behavior Unit Treatment Team    Target symptoms: Substance Abuse    Patient's response to treatment: Active Listening and Self-disclosure    Progress toward goals: Progressing adequately    Interval History:  Discussed numerous coping strategies assessed via the Stress Map.    Diagnosis: Opiate use disorder, severe, in early remission    Plan: Continue treatment on ABU

## 2017-02-16 NOTE — NURSING
Observed pt. Was not in the required AA meeting for our pts. Here at [facility].  Other pts. In the group stated Osman had an appt. With his Dad that he had to go to.   Called pt. On this cell phone and pt. Stated he had an appt. That he had to go to today and that he went to an AA meeting this AM.  On admit all pts. Are inst that they stay until 4 PM for the AA meeting here at [facility] on Thursdays.. Reiterated this info. To the pt. And also he. Is not to leave without informing the staff and having the  DrRadha's permission to leave early.   Pt. Apologized for not informing us. Inst. Pt. We will talk about it more tomorrow.

## 2017-02-17 ENCOUNTER — HOSPITAL ENCOUNTER (OUTPATIENT)
Dept: PSYCHIATRY | Facility: HOSPITAL | Age: 32
Discharge: HOME | End: 2017-02-17
Attending: PSYCHIATRY & NEUROLOGY
Payer: COMMERCIAL

## 2017-02-17 VITALS — DIASTOLIC BLOOD PRESSURE: 85 MMHG | SYSTOLIC BLOOD PRESSURE: 138 MMHG | RESPIRATION RATE: 18 BRPM | HEART RATE: 95 BPM

## 2017-02-17 DIAGNOSIS — F11.21 OPIOID DEPENDENCE IN REMISSION: Primary | ICD-10-CM

## 2017-02-17 DIAGNOSIS — F98.8 ADD (ATTENTION DEFICIT DISORDER): ICD-10-CM

## 2017-02-17 DIAGNOSIS — F41.9 ANXIETY: ICD-10-CM

## 2017-02-17 LAB
AMPHET+METHAMPHET UR QL: NEGATIVE
BARBITURATES UR QL SCN>200 NG/ML: NEGATIVE
BENZODIAZ UR QL SCN>200 NG/ML: NEGATIVE
BREATH ALCOHOL: 0
BZE UR QL SCN: NEGATIVE
CANNABINOIDS UR QL SCN: NEGATIVE
CREAT UR-MCNC: 12 MG/DL
ETHANOL UR-MCNC: <10 MG/DL
METHADONE UR QL SCN>300 NG/ML: NEGATIVE
OPIATES UR QL SCN: NEGATIVE
PCP UR QL SCN>25 NG/ML: NEGATIVE
TOXICOLOGY INFORMATION: ABNORMAL

## 2017-02-17 PROCEDURE — 80307 DRUG TEST PRSMV CHEM ANLYZR: CPT

## 2017-02-17 PROCEDURE — 99232 SBSQ HOSP IP/OBS MODERATE 35: CPT | Mod: ,,, | Performed by: PSYCHIATRY & NEUROLOGY

## 2017-02-17 PROCEDURE — 90853 GROUP PSYCHOTHERAPY: CPT

## 2017-02-17 PROCEDURE — 90853 GROUP PSYCHOTHERAPY: CPT | Mod: ,,, | Performed by: PSYCHOLOGIST

## 2017-02-17 PROCEDURE — 90853 GROUP PSYCHOTHERAPY: CPT | Mod: 59,,, | Performed by: PSYCHOLOGIST

## 2017-02-17 PROCEDURE — 90853 GROUP PSYCHOTHERAPY: CPT | Performed by: SOCIAL WORKER

## 2017-02-17 NOTE — PROGRESS NOTES
Group Psychotherapy (PhD/LCSW)    Site: [facility]    Clinical status of patient: Intensive Outpatient Program (IOP)    Date: 2/17/2017    Group Focus: Stress Management    Length of service: 63657 - 45-50 minutes    Number of patients in attendance: 17    Referred by: Addictive Behavior Unit Treatment Team    Target symptoms: Substance Abuse    Patient's response to treatment: Active Listening    Progress toward goals: Progressing adequately    Interval History: Group learned mindfulness techniques (breathing, progressive muscle relaxation) to improve present-moment awareness, impulsive behavior tendencies, and tolerance of various emotional states.    Diagnosis: Opioid Use Disorder in early remission    Plan: Continue treatment on ABU

## 2017-02-17 NOTE — PROGRESS NOTES
Patient placed on probation for leaving the premises early without staff permission 2/16/2017.  Noted by staff that patient at the time also appeared noticeably intoxicated and left right before leaving, verbally incoherent, somnolent.  He flatly denied being on any mood-altering substance.  Situation discussed with acting attending psychiatrist, Dr. Hughes.  Patient confirmed he has the residential treatment program list provided to him when he was placed on alert status.

## 2017-02-17 NOTE — PLAN OF CARE
02/17/17 1000   Activity/Group Therapy Checklist   Group Addiction Education  (Peer presented life story; feedback given. )   Attendance Attended   Follows Direction Followed directions   Group Interactions/Observations Interacted appropriately;Supportive   Affect/Mood Range Normal range   Affect/Mood Display Appropriate   Goal Progression Progressing

## 2017-02-17 NOTE — TREATMENT PLAN
Spoke individually with patient to review treatment plan progress, as discussed in weekly staffing.  See patient care conference note for content.  Patient on probation for compliance issues--leaving, missing a meeting.  Parents are planning to attend Family Day 2/21/17.  Patient is participating in group, listening, giving feedback, and talking about himself.  Tentative proposed completion date of 3/1/17, but this is dependent on patient successfully improving his level of compliance with program requirements.

## 2017-02-17 NOTE — PROGRESS NOTES
PSYCHIATRY  ABU Partial Hospitalization  PROGRESS NOTE    Patient Name: Polo Kauffman  2017  11:27 AM  Start Date: 2017  : 1985    Status: Intensive Outpatient Program (IOP)  CC: Opioid Use Disorder    SUBJECTIVE:  Pt was confronted this morning regarding his behaviour yesterday. Staff reported that he had been slurring speech, was falling asleep in group, and having difficulty walking in a straight line. Dr. Hughes pointed out that his symptoms were consistent with intoxication, pt denied that he had taken any kind of substances and denied being intoxicated yesterday. Of note, his girlfriend has called to say that she is suspicious that he has been taking something that is not traceable in a drug test. Pt is most likely unaware that the she has called. He thinks that his symptoms described above were side effects of either vyvance or prazosin. We let him know that its very unlikely. Otherwise he states he is sleeping well, and does not have any complaints. No changes in appetite, or energy.    * of note, pt brought his bottle of vyvance 50mg in today as instructed. The prescription was written on , today is . There should be 10 tablets left in the bottle, but there are only 2 tablets left.      Medication Side Effects: denied  Cravings: no    Scheduled Meds:   Current Outpatient Prescriptions on File Prior to Encounter   Medication Sig Dispense Refill    abatacept (ORENCIA) 125 mg/mL Syrg Inject 125 mg into the skin every 7 days. 4 mL 5    acetaminophen (TYLENOL) 500 MG tablet Take 1 tablet (500 mg total) by mouth every 4 (four) hours as needed for Pain. Do not take more than 3000 mg (6 tablets) daily.  0    baclofen (LIORESAL) 10 MG tablet Take 1 tablet (10 mg total) by mouth 2 (two) times daily. 60 tablet 2    calcium-vitamin D (OSCAL) 250 (625)-125 mg-unit per tablet Take 1 tablet by mouth once daily.      gabapentin (NEURONTIN) 300 MG capsule Take 1 capsule (300 mg total)  by mouth 3 (three) times daily. 90 capsule 2    ibuprofen (ADVIL,MOTRIN) 600 MG tablet Take 1 tablet (600 mg total) by mouth every 6 (six) hours as needed for Pain. 120 tablet 2    LACTOBAC CMB #3/FOS/PANTETHINE (PROBIOTIC & ACIDOPHILUS ORAL) Take by mouth.      lisdexamfetamine (VYVANSE) 70 MG capsule Take 1 capsule (70 mg total) by mouth every morning. 30 capsule 0    mirtazapine (REMERON) 15 MG tablet       MULTIVIT-IRON-MIN-FOLIC ACID 3,500-18-0.4 UNIT-MG-MG ORAL CHEW Take by mouth.      naltrexone microspheres 380 mg SSRR kit Inject 1 each (380 mg total) into the muscle once. 1 each 0    prazosin (MINIPRESS) 1 MG Cap Take 1 capsule daily at bedtime, after 3 days increase to 2 capsules at bedtime. 60 capsule 1    sildenafil (VIAGRA) 100 MG tablet Take 1 tablet (100 mg total) by mouth daily as needed for Erectile Dysfunction. Take 1 hour before intercourse. 10 tablet 11     No current facility-administered medications on file prior to encounter.      ALLERGIES:  No Known Allergies    Psychiatric Review Of Systems - Is patient experiencing or having changes in:  sleep: no   appetite: denied  Weight: lost 50# since beginning rehab  energy/anergy: denied  interest/pleasure/anhedonia: denied  somatic symptoms: denied  libido: denied  anxiety/panic: no, improved basal level of anxiety on Gabapentin  guilty/hopelessness: denied  concentration: denied  S.I.B.s/risky behavior: denied  Irritability: denied  Racing thoughts: denied  Impulsive behaviors: denied  Paranoia: denied  AVH: denied    Medical ROS:  See Dr. Vilchis's Admission Note of date 1/26/2017 for ROS.     OBJECTIVE:  There were no vitals filed for this visit.    Mental Status Exam:  Appearance: young white male wearing casual clothes  Behavior: cooperative, calm  Speech: normal rate, tone, and volume  Language: able to state 3 words  Mood: ok, but not feeling well  Affect: full  Thought Process: linear  Thought Perceptions: denied AVH  Thought  "Content: denied SI, HI; no delusions apparent  Sensorium: awake, alert  Attention/Concentration: intact to conversation  Orientation: person, place, time, and situation  Memory: intact (recent, remote)  Abstraction: intact   Insight: fair  Judgment: fair      ASSESSMENT/PLAN:  Opiate Use Disorder, severe  Unspecified Anxiety Disorder  Attention Deficit Disorder  PTSD    Cluster B traits:  Narcissisism    Status:  Continue treatment on ABU    Plan:   1) Vital signs 3x a week  2) Daily breathalyzer and urine tox screen  3) ABU protocol  4) Pt "splitting" staff.  Will only see together with attending M-W-F.      Patient's Intervention Response: accepting, reluctant    Medications:    - Vyvanse 50 mg PO daily for inattentive symptoms of ADHD.  Increase to 70 mg qd 2/1.  Pt did bring his 50mg of Vyvance in. It was prescribed on 1/27, today is 1/17. He was prescribed #30. He should have 10 tablets left, but there are only 2 in the bottle.  - Gabapentin 600 mg PO BID for anxiety and for RA pain  - Zoloft 150 mg PO daily for anxiety and depression  - Remeron 15mg PO qhs  - Vivitrol shot 2/13/17  - Ibuprofen 800 mg q6h PRN for pain  - Currently taking prazosin 1mg PO qhs, recommended increase to 2mg qhs  - Will refer for EMDR treatment at discharge:  [facility]  - At pt's request, Dr. Doe suggested [facility] for relationships counseling.  Pt does not want Dr. Davalos.  - Family meeting with parents in two weeks, Feb 21, 2017.    Additional Comments: none    Pt seen and discussed with Dr. Ellen Price MD MPH  [facility]  [facility]  "

## 2017-02-17 NOTE — PROGRESS NOTES
Group Psychotherapy (PhD/LCSW)    Site: [facility]    Clinical status of patient: Intensive Outpatient Program (IOP)    Date: 2/17/2017    Group Focus: Psychodynamic Group Psychotherapy    Length of service: 33806 - 45-50 minutes    Number of patients in attendance: 12    Referred by: Addictive Behavior Unit Treatment Team    Target symptoms: Substance Abuse    Patient's response to treatment: Active Listening, Feedback, and Self-disclosure    Progress toward goals: Progressing adequately    Interval History: Discussed the concerns of the group that he had relapsed yesterday afternoon. Assured group that this was not the case. Attributed his drowsy behavior to medication changes. Reiterated his commitment to sobriety.  Thanked group for their concern.     Diagnosis: Opiate use disorder, severe, in early remission    Plan: Continue treatment on ABU

## 2017-02-20 ENCOUNTER — HOSPITAL ENCOUNTER (OUTPATIENT)
Dept: PSYCHIATRY | Facility: HOSPITAL | Age: 32
Discharge: HOME | End: 2017-02-20
Attending: PSYCHIATRY & NEUROLOGY
Payer: COMMERCIAL

## 2017-02-20 VITALS — DIASTOLIC BLOOD PRESSURE: 69 MMHG | HEART RATE: 112 BPM | RESPIRATION RATE: 18 BRPM | SYSTOLIC BLOOD PRESSURE: 122 MMHG

## 2017-02-20 DIAGNOSIS — F98.8 ADD (ATTENTION DEFICIT DISORDER): ICD-10-CM

## 2017-02-20 DIAGNOSIS — F41.9 ANXIETY: ICD-10-CM

## 2017-02-20 DIAGNOSIS — F11.21 OPIOID DEPENDENCE IN REMISSION: Primary | ICD-10-CM

## 2017-02-20 LAB
AMPHET+METHAMPHET UR QL: NEGATIVE
BARBITURATES UR QL SCN>200 NG/ML: NEGATIVE
BENZODIAZ UR QL SCN>200 NG/ML: NEGATIVE
BREATH ALCOHOL: 0
BZE UR QL SCN: NEGATIVE
CANNABINOIDS UR QL SCN: NEGATIVE
CREAT UR-MCNC: 76 MG/DL
ETHANOL UR-MCNC: <10 MG/DL
METHADONE UR QL SCN>300 NG/ML: NEGATIVE
OPIATES UR QL SCN: NEGATIVE
PCP UR QL SCN>25 NG/ML: NEGATIVE
TOXICOLOGY INFORMATION: NORMAL

## 2017-02-20 PROCEDURE — 90853 GROUP PSYCHOTHERAPY: CPT | Performed by: SOCIAL WORKER

## 2017-02-20 PROCEDURE — 90853 GROUP PSYCHOTHERAPY: CPT | Mod: ,,, | Performed by: PSYCHOLOGIST

## 2017-02-20 PROCEDURE — 36415 COLL VENOUS BLD VENIPUNCTURE: CPT

## 2017-02-20 PROCEDURE — 80307 DRUG TEST PRSMV CHEM ANLYZR: CPT

## 2017-02-20 PROCEDURE — 90853 GROUP PSYCHOTHERAPY: CPT

## 2017-02-20 PROCEDURE — 90853 GROUP PSYCHOTHERAPY: CPT | Mod: 59,,, | Performed by: PSYCHOLOGIST

## 2017-02-20 PROCEDURE — 80365 DRUG SCREENING OXYCODONE: CPT

## 2017-02-20 PROCEDURE — 80346 BENZODIAZEPINES1-12: CPT

## 2017-02-20 PROCEDURE — 99233 SBSQ HOSP IP/OBS HIGH 50: CPT | Mod: ,,, | Performed by: PSYCHIATRY & NEUROLOGY

## 2017-02-20 NOTE — PLAN OF CARE
02/20/17 1000   Activity/Group Therapy Checklist   Group Addiction Education  (Peer presented life story; feedback given.)   Attendance Attended   Follows Direction Followed directions   Group Interactions/Observations Interacted appropriately;Supportive   Affect/Mood Range Normal range   Affect/Mood Display Appropriate   Goal Progression Progressing

## 2017-02-20 NOTE — PSYCH
Telephone note:    Received request for phone call from patient's girlfriend, consent in chart    Spoke with patient's girlfriend, Crystal, who noted that he has been overly tired this past weekend and appeared to be hallucinating, seeing things, would nod off. Pupils were dilated.

## 2017-02-20 NOTE — PROGRESS NOTES
PSYCHIATRY  ABU Partial Hospitalization  PROGRESS NOTE    Patient Name: Polo Kauffman  2017  11:27 AM  Start Date: 2017  : 1985    Status: Intensive Outpatient Program (IOP)  CC: Opioid Use Disorder    SUBJECTIVE:  Pt states that he has not been himself over the weekend. He thinks he has been very sleepy, and not able to think quite straight. He wonders if it could be due to the medications that he is on, or the combination of medications he is on. He wanted to reiterate that he was very shocked about our meeting on Friday where he was confronted about behaviour that was suggestive of intoxication. He assures us he takes his sobriety very seriously and has not been abusing substances. He stopped taking the prazosin because he thought perhaps it was contributing to feeling too sleepy in the afternoon. He says he is aware that his girlfriend called us to let us know about a blue pill that she found under the sofa. He doesn't take any blue pills, and doesn't know what she would be talking about.       Medication Side Effects: denied  Cravings: no    Scheduled Meds:   Current Outpatient Prescriptions on File Prior to Encounter   Medication Sig Dispense Refill    abatacept (ORENCIA) 125 mg/mL Syrg Inject 125 mg into the skin every 7 days. 4 mL 5    acetaminophen (TYLENOL) 500 MG tablet Take 1 tablet (500 mg total) by mouth every 4 (four) hours as needed for Pain. Do not take more than 3000 mg (6 tablets) daily.  0    baclofen (LIORESAL) 10 MG tablet Take 1 tablet (10 mg total) by mouth 2 (two) times daily. 60 tablet 2    calcium-vitamin D (OSCAL) 250 (625)-125 mg-unit per tablet Take 1 tablet by mouth once daily.      gabapentin (NEURONTIN) 300 MG capsule Take 1 capsule (300 mg total) by mouth 3 (three) times daily. 90 capsule 2    ibuprofen (ADVIL,MOTRIN) 600 MG tablet Take 1 tablet (600 mg total) by mouth every 6 (six) hours as needed for Pain. 120 tablet 2    LACTOBAC CMB  #3/FOS/PANTETHINE (PROBIOTIC & ACIDOPHILUS ORAL) Take by mouth.      lisdexamfetamine (VYVANSE) 70 MG capsule Take 1 capsule (70 mg total) by mouth every morning. 30 capsule 0    mirtazapine (REMERON) 15 MG tablet       MULTIVIT-IRON-MIN-FOLIC ACID 3,500-18-0.4 UNIT-MG-MG ORAL CHEW Take by mouth.      naltrexone microspheres 380 mg SSRR kit Inject 1 each (380 mg total) into the muscle once. 1 each 0    prazosin (MINIPRESS) 1 MG Cap Take 1 capsule daily at bedtime, after 3 days increase to 2 capsules at bedtime. 60 capsule 1    sildenafil (VIAGRA) 100 MG tablet Take 1 tablet (100 mg total) by mouth daily as needed for Erectile Dysfunction. Take 1 hour before intercourse. 10 tablet 11     No current facility-administered medications on file prior to encounter.      ALLERGIES:  No Known Allergies    Psychiatric Review Of Systems - Is patient experiencing or having changes in:  sleep: no   appetite: denied  Weight: lost 50# since beginning rehab  energy/anergy: denied  interest/pleasure/anhedonia: denied  somatic symptoms: denied  libido: denied  anxiety/panic: no, improved basal level of anxiety on Gabapentin  guilty/hopelessness: denied  concentration: denied  S.I.B.s/risky behavior: denied  Irritability: denied  Racing thoughts: denied  Impulsive behaviors: denied  Paranoia: denied  AVH: denied    Medical ROS:  See Dr. Vilchis's Admission Note of date 1/26/2017 for ROS.     OBJECTIVE:  Vitals:    02/20/17 1118   BP: 122/69   Pulse: (!) 112   Resp: 18       Mental Status Exam:  Appearance: young white male wearing casual clothes  Behavior: cooperative, calm  Speech: normal rate, tone, and volume  Language: able to state 3 words  Mood: ok, but not feeling well  Affect: full  Thought Process: linear  Thought Perceptions: denied AVH  Thought Content: denied SI, HI; no delusions apparent  Sensorium: awake, alert  Attention/Concentration: intact to conversation  Orientation: person, place, time, and  "situation  Memory: intact (recent, remote)  Abstraction: intact   Insight: fair  Judgment: fair      ASSESSMENT/PLAN:  Opiate Use Disorder, severe  Unspecified Anxiety Disorder  Attention Deficit Disorder  PTSD    Cluster B traits:  Narcissisism    R/O Sedative Use Disorder    Status:  Continue treatment on ABU    Plan:   1) Vital signs 3x a week  2) Daily breathalyzer and urine tox screen  3) ABU protocol  4) Pt "splitting" staff.  Will only see together with attending MDarbyWDarbyF.      Patient's Intervention Response: accepting, reluctant    Medications:    - Vyvanse 50 mg PO daily for inattentive symptoms of ADHD.  Increase to 70 mg qdaily 2/1.  Pt did bring his 50mg of Vyvance in. It was prescribed on 1/27, today is 1/17. He was prescribed #30. He should have 10 tablets left, but there were only 2 in the bottle.  - Gabapentin 600 mg PO BID for anxiety and for RA pain  - Zoloft 150 mg PO daily for anxiety and depression  - Remeron 15mg PO qhs  - Vivitrol shot 2/13/17  - Ibuprofen 800 mg q6h PRN for pain  - Currently taking prazosin 1mg PO qhs, recommended increase to 2mg qhs  - taking baclofen 20mg qhs- confirmed on 2/20  - stopped taking the prazosin because he thought it might be making him sleepy during the day  - recommended making an appointment with PCP, or with his rheumatologist    Additional Comments:     - Will refer for EMDR treatment at discharge:  Jessica Moya  - At pt's request, Dr. Doe suggested [facility] or [facility] for relationships counseling.  Pt does not want Dr. Davalos.      Pt seen and discussed with Dr. Brook Price MD MPH  [facility]  [facility]    Attending Attestation:    I have independently evaluated the patient and discussed the case with the resident. I have reviewed this note and agree with its contents, as well as the assessment and plan. Patient displaying encephalopathy, suggestive of intoxication. However urine drug testing negative. Unclear " whether patient manipulating urine or using undetectable substance. Would also want to rule out medical condition or affect of current medications. However time course and nature of sx not consistent with current meds, which should not cause significant sedation if taken as directed and sx starting after most med changes. Adding on urine quantitative opioid and benzodiazepine tests, Monitoring vitals, recommending that he set up appointment with primary care.    Karla Doe MD

## 2017-02-20 NOTE — PROGRESS NOTES
Group Psychotherapy (PhD/LCSW)    Site: [facility]    Clinical status of patient: Intensive Outpatient Program (IOP)    Date: 2/20/2017    Group Focus: Communication Skills       Length of service: 66430 - 45-50 minutes    Number of patients in attendance: 15    Referred by: Addictive Behavior Unit Treatment Team    Target symptoms: Substance Abuse    Patient's response to treatment: Active Listening, Feedback, and Self-disclosure    Progress toward goals: Progressing adequately    Interval History: Discussed basic communication skills and their application: I-messages; Reflective Listening; Contextual considerations. Role played the use of Reflective Listening and I-messages re: current problem facing a group member. Noted the value of I-messages for establishing and maintaining boundaries as well as enhancing communication.      Diagnosis: Opiate use disorder, severe, in early remission    Plan: Continue treatment on ABU

## 2017-02-20 NOTE — PROGRESS NOTES
Group Psychotherapy (PhD/LCSW)    Site: [facility]    Clinical status of patient: Intensive Outpatient Program (IOP)    Date: 2/20/2017    Group Focus: Psychodynamic Group Psychotherapy    Length of service: 56327 - 45-50 minutes    Number of patients in attendance: 12    Referred by: Addictive Behavior Unit Treatment Team    Target symptoms: Substance Abuse    Patient's response to treatment: Active Listening and Self-disclosure    Progress toward goals: Progressing adequately    Interval History: Apologized for appearing intoxicated last week. Says it was his prescribed medicine that caused his to appear intoxicated.    Diagnosis: Opiate use disorder, severe, in early remission    Plan: Continue treatment on ABU

## 2017-02-20 NOTE — PLAN OF CARE
02/20/17 1400   Activity/Group Therapy Checklist   Group Goals/Reflection  (miracle question         )   Attendance Attended   Follows Direction Followed directions   Group Interactions/Observations Interacted appropriately   Affect/Mood Range Normal range   Affect/Mood Display Appropriate   Goal Progression Progressing

## 2017-02-20 NOTE — NURSING
Called and spoke with Azalia in the lab.  Requested a confirmation on all opiates and Benzos from today's urine sample per Dr. Doe's orders.  This will be a send-out and instRadha Ponce his urine will prob. Show negative for these 2 substances but still will need a confirmation .  Acknowledged full understanding.

## 2017-02-20 NOTE — PLAN OF CARE
02/17/17 1400   Activity/Group Therapy Checklist   Group Educational  (life balance )   Attendance Attended   Follows Direction Followed directions   Group Interactions/Observations Interacted appropriately   Affect/Mood Range Normal range   Affect/Mood Display Appropriate   Goal Progression Progressing

## 2017-02-21 ENCOUNTER — HOSPITAL ENCOUNTER (OUTPATIENT)
Dept: PSYCHIATRY | Facility: HOSPITAL | Age: 32
Discharge: HOME | End: 2017-02-21
Attending: PSYCHIATRY & NEUROLOGY
Payer: COMMERCIAL

## 2017-02-21 VITALS — RESPIRATION RATE: 18 BRPM | DIASTOLIC BLOOD PRESSURE: 88 MMHG | HEART RATE: 98 BPM | SYSTOLIC BLOOD PRESSURE: 143 MMHG

## 2017-02-21 DIAGNOSIS — E29.1 HYPOGONADISM IN MALE: ICD-10-CM

## 2017-02-21 DIAGNOSIS — I73.00 RAYNAUD'S PHENOMENON WITHOUT GANGRENE: ICD-10-CM

## 2017-02-21 DIAGNOSIS — M06.00 SERONEGATIVE RHEUMATOID ARTHRITIS: Chronic | ICD-10-CM

## 2017-02-21 DIAGNOSIS — F98.8 ADD (ATTENTION DEFICIT DISORDER): ICD-10-CM

## 2017-02-21 DIAGNOSIS — F11.21 OPIOID DEPENDENCE IN REMISSION: Primary | ICD-10-CM

## 2017-02-21 DIAGNOSIS — F41.9 ANXIETY: ICD-10-CM

## 2017-02-21 LAB
AMPHET+METHAMPHET UR QL: NEGATIVE
AMPHET+METHAMPHET UR QL: NEGATIVE
BARBITURATES UR QL SCN>200 NG/ML: NEGATIVE
BARBITURATES UR QL SCN>200 NG/ML: NEGATIVE
BENZODIAZ UR QL SCN>200 NG/ML: NEGATIVE
BENZODIAZ UR QL SCN>200 NG/ML: NEGATIVE
BREATH ALCOHOL: 0
BREATH ALCOHOL: 0
BZE UR QL SCN: NEGATIVE
BZE UR QL SCN: NEGATIVE
CANNABINOIDS UR QL SCN: NEGATIVE
CANNABINOIDS UR QL SCN: NEGATIVE
CREAT UR-MCNC: 19 MG/DL
CREAT UR-MCNC: 8 MG/DL
ETHANOL UR-MCNC: <10 MG/DL
ETHANOL UR-MCNC: <10 MG/DL
METHADONE UR QL SCN>300 NG/ML: NEGATIVE
METHADONE UR QL SCN>300 NG/ML: NEGATIVE
OPIATES UR QL SCN: NEGATIVE
OPIATES UR QL SCN: NEGATIVE
PCP UR QL SCN>25 NG/ML: NEGATIVE
PCP UR QL SCN>25 NG/ML: NEGATIVE
TOXICOLOGY INFORMATION: ABNORMAL
TOXICOLOGY INFORMATION: ABNORMAL

## 2017-02-21 PROCEDURE — 80365 DRUG SCREENING OXYCODONE: CPT

## 2017-02-21 PROCEDURE — 90853 GROUP PSYCHOTHERAPY: CPT | Performed by: SOCIAL WORKER

## 2017-02-21 PROCEDURE — 80307 DRUG TEST PRSMV CHEM ANLYZR: CPT | Mod: 59

## 2017-02-21 PROCEDURE — 90836 PSYTX W PT W E/M 45 MIN: CPT | Mod: ,,, | Performed by: PSYCHIATRY & NEUROLOGY

## 2017-02-21 PROCEDURE — 90853 GROUP PSYCHOTHERAPY: CPT | Mod: ,,, | Performed by: PSYCHOLOGIST

## 2017-02-21 PROCEDURE — 99233 SBSQ HOSP IP/OBS HIGH 50: CPT | Mod: ,,, | Performed by: PSYCHIATRY & NEUROLOGY

## 2017-02-21 PROCEDURE — 80346 BENZODIAZEPINES1-12: CPT

## 2017-02-21 PROCEDURE — 90853 GROUP PSYCHOTHERAPY: CPT

## 2017-02-21 NOTE — PLAN OF CARE
02/21/17 1400   Activity/Group Therapy Checklist   Group Relapse Prevention  (Warning signs of the relapse process)   Attendance Attended   Follows Direction Followed directions   Group Interactions/Observations Interacted appropriately;Sharing   Affect/Mood Range Normal range   Affect/Mood Display Appropriate   Goal Progression Progressing

## 2017-02-21 NOTE — NURSING
Collected 2nd urine tox screen per MD orders,  Observed pt. As much as possible from outside the stall.  Pt. Requested that I give him some space so I observed what I could from outside the door.  Called the lab and spoke to Oleg to get a confirmation done for opiates and benzos on the 2nd urine collection.  V.S. Taken per MD's orders, see flowsheet.

## 2017-02-21 NOTE — PROGRESS NOTES
Group Psychotherapy (PhD/LCSW)    Site: [facility]    Clinical status of patient: Intensive Outpatient Program (IOP)    Date: 2/21/2017    Group Focus: Psychodynamic Group Psychotherapy    Length of service: 63775 - 45-50 minutes    Number of patients in attendance: 1    Referred by: Addictive Behavior Unit Treatment Team    Target symptoms: Substance Abuse    Patient's response to treatment: Active Listening and Self-disclosure    Progress toward goals: Progressing poorly     Interval History: Left during middle of group for 10 minutes. Appeared intoxicated. 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    Diagnosis: Opiate use disorder, severe, in early remission    Plan: Continue treatment on ABU

## 2017-02-21 NOTE — PLAN OF CARE
02/21/17 1000   Activity/Group Therapy Checklist   Group Educational  (family roles   )   Attendance Attended   Follows Direction Followed directions   Group Interactions/Observations Drowsy

## 2017-02-22 ENCOUNTER — HOSPITAL ENCOUNTER (OUTPATIENT)
Dept: PSYCHIATRY | Facility: HOSPITAL | Age: 32
Discharge: HOME | End: 2017-02-22
Attending: PSYCHIATRY & NEUROLOGY
Payer: COMMERCIAL

## 2017-02-22 VITALS — SYSTOLIC BLOOD PRESSURE: 135 MMHG | HEART RATE: 106 BPM | DIASTOLIC BLOOD PRESSURE: 82 MMHG | RESPIRATION RATE: 18 BRPM

## 2017-02-22 DIAGNOSIS — F41.9 ANXIETY: ICD-10-CM

## 2017-02-22 DIAGNOSIS — F98.8 ADD (ATTENTION DEFICIT DISORDER): ICD-10-CM

## 2017-02-22 DIAGNOSIS — F11.21 OPIOID DEPENDENCE IN REMISSION: ICD-10-CM

## 2017-02-22 LAB — BREATH ALCOHOL: 0

## 2017-02-22 PROCEDURE — 99233 SBSQ HOSP IP/OBS HIGH 50: CPT | Mod: ,,, | Performed by: PSYCHIATRY & NEUROLOGY

## 2017-02-22 NOTE — PATIENT CARE CONFERENCE
Opiate Use Disorder, severe, in early remission  Unspecified Anxiety Disorder      1. Pt is attending all groups    2. Pt is attending all meetings  3. Pt 's family is supportive of treatment    4. Pt has completed spiritual assessment    5. Pt will present life story    6. Pt will present Step One assignment    7. Pt is exploring issues related to relapse  prevention; spirituality; stress management; improved communication skills; assertiveness training; poor self-esteem; disease concepts; cross addictions; and, work related issues    8. D/C date: 2/22/17          Staff discussed pt's relapse on xanax. Staffing discussed pt's parents attending family day yesterday. Staffing also discussed d/c pt and recommending a higher level of care as pt is currently using.    Problem: Opioid use d/o, severe, in early remission  Goal: Address in 12 step meetings and group and individual sessions    Objective Measure: participation in groups, self report, length of sobriety, and relapse prevention plan  Time: Prior to discharge    Progress: Pt is attending groups and sessions          Problem: Unspecified Anxiety Disorder  Goal: Address in 12 step meetings and group and individual sessions    Objective Measure: participation in groups, self report, length of sobriety, and relapse prevention plan  Time: Prior to discharge    Progress: Pt is attending groups and sessions            Staff members present:    MD Dr. Brook Decker MD Dr. Parker, MD Dr. Correa, Ph.D.  Sumeet Kearns [facility]  Kellen Bray, [facility]  Tracey Chaudhry RN

## 2017-02-22 NOTE — PROGRESS NOTES
[facility] Day Program  PROGRESS NOTE    Patient Name: Polo Kauffman  17  11:00AM  : 1985    Status: Intensive Outpatient Program (IOP)    SUBJECTIVE:    Patient is a 31 y.o. old, male, with past medical history of rhematoid arthritis, anxiety and ADHD, admitted with principal problem of opioid use disorder.     Had family meeting with patient and parents. Family expresses concern that he has relapsed because of intoxicated behavior (glazed look, confusion, mood swings, slurred speech) which is similar to behavior he exhibited when using. Patient denies having relapsed, denies using any substances. Patient denies other physical complaints. Pt expressed concern that this was due to medication changes. However, I noted to patient that he has been on the same medications for a few weeks and the changes are more recent, and further that the medications he takes are unlikely to cause these symptoms. Patient notably more confused during appointment, compared to baseline, mispeaking about doses, dates, appearing confused about time. Discussed the change in behavior with patient and parents, informed them of belief t recommended increased level of urine testing (observed, with confirmatory quantitative tests, and possible hair testing), as well as making appointment with his primary care doctors. Although current meds are highly unlikely to be causing his mental status, made adjustments to his medication to minimize any possible contribution.    After meeting spoke with patient's girlfriend on the phone. Girlfriend yesterday reported he has been hallucinating. Today she reports that she found a  among his possessions (used for grinding xanax in the past by patient), that he has been going out to the car for unclear reasons late at night which is where he typically uses and that she found 1/2 xanax bar with additional pieces that looked crushed on his table.    Scheduled Meds:   Current Outpatient  Prescriptions on File Prior to Encounter   Medication Sig Dispense Refill    abatacept (ORENCIA) 125 mg/mL Syrg Inject 125 mg into the skin every 7 days. 4 mL 5    acetaminophen (TYLENOL) 500 MG tablet Take 1 tablet (500 mg total) by mouth every 4 (four) hours as needed for Pain. Do not take more than 3000 mg (6 tablets) daily.  0    baclofen (LIORESAL) 10 MG tablet Take 1 tablet (10 mg total) by mouth 2 (two) times daily. 60 tablet 2    calcium-vitamin D (OSCAL) 250 (625)-125 mg-unit per tablet Take 1 tablet by mouth once daily.      gabapentin (NEURONTIN) 300 MG capsule Take 1 capsule (300 mg total) by mouth 3 (three) times daily. 90 capsule 2    ibuprofen (ADVIL,MOTRIN) 600 MG tablet Take 1 tablet (600 mg total) by mouth every 6 (six) hours as needed for Pain. 120 tablet 2    LACTOBAC CMB #3/FOS/PANTETHINE (PROBIOTIC & ACIDOPHILUS ORAL) Take by mouth.      lisdexamfetamine (VYVANSE) 70 MG capsule Take 1 capsule (70 mg total) by mouth every morning. 30 capsule 0    mirtazapine (REMERON) 15 MG tablet       MULTIVIT-IRON-MIN-FOLIC ACID 3,500-18-0.4 UNIT-MG-MG ORAL CHEW Take by mouth.      naltrexone microspheres 380 mg SSRR kit Inject 1 each (380 mg total) into the muscle once. 1 each 0    prazosin (MINIPRESS) 1 MG Cap Take 1 capsule daily at bedtime, after 3 days increase to 2 capsules at bedtime. 60 capsule 1    sildenafil (VIAGRA) 100 MG tablet Take 1 tablet (100 mg total) by mouth daily as needed for Erectile Dysfunction. Take 1 hour before intercourse. 10 tablet 11     No current facility-administered medications on file prior to encounter.          ALLERGIES:  Review of patient's allergies indicates:  No Known Allergies      OBJECTIVE:    Vital Signs (Most Recent)  Vitals:    02/21/17 1328   BP: (!) 143/88   Pulse: 98   Resp: 18       Mental Status Exam:  Appearance: fairly groomed, appering stated age  Behavior/Cooperation: slower, cooperative  Speech: slower, slurred  Mood: fine  Affect: mildly  irritable  Thought Process: linear  Thought Content: normal, no suicidality, no homicidality, delusions, or paranoia  Orientation: grossly intact  Memory: Impaired to some degree, with patient repeatedly confusing dates and doses  Attention Span/Concentration: Normal  Cognition: slower  Insight: limited  Judgment: limited    Laboratory:  Recent Results (from the past 48 hour(s))   Toxicology screen, urine    Collection Time: 02/21/17 11:54 AM   Result Value Ref Range    Alcohol, Urine <10 <10 mg/dL    Benzodiazepines Negative     Methadone metabolites Negative     Cocaine (Metab.) Negative     Opiate Scrn, Ur Negative     Barbiturate Screen, Ur Negative     Amphetamine Screen, Ur Negative     THC Negative     Phencyclidine Negative     Creatinine, Random Ur 19.0 (L) 23.0 - 375.0 mg/dL    Toxicology Information SEE COMMENT    POCT BREATH ALCOHOL TEST    Collection Time: 02/21/17 11:54 AM   Result Value Ref Range    Breath Alcohol 0.000    POCT BREATH ALCOHOL TEST    Collection Time: 02/21/17 12:08 PM   Result Value Ref Range    Breath Alcohol 0.000    Toxicology screen, urine    Collection Time: 02/21/17  1:27 PM   Result Value Ref Range    Alcohol, Urine <10 <10 mg/dL    Benzodiazepines Negative     Methadone metabolites Negative     Cocaine (Metab.) Negative     Opiate Scrn, Ur Negative     Barbiturate Screen, Ur Negative     Amphetamine Screen, Ur Negative     THC Negative     Phencyclidine Negative     Creatinine, Random Ur 8.0 (L) 23.0 - 375.0 mg/dL    Toxicology Information SEE COMMENT    POCT BREATH ALCOHOL TEST    Collection Time: 02/22/17 11:30 AM   Result Value Ref Range    Breath Alcohol 0.000        Diagnosis:    Axis I:    Opioid Use Disorder, severe, in early remission, Anxiety Disorder NOS, ADHD, inattentive type; r/o sedative use disorder, rule out active opioid use disorder, r/o PTSD  Axis II:   Narcissistic Personality traits  Axis III:   Rheumatoid Arthritis    ASSESSMENT/PLAN:    Status:  Continue  treatment on ABU    Plan: Medication Management:  Will reduce gabapentin to 600 mg tid, stop vyvanse and stop prazosin (since patient most likely abusing substances and to minimize contribution from medication to mental status, even though unlikely related) . Continue remeron 15 mg at bedtime, continue zoloft 100 mg daily (team understood dose to be 150 mg, but patient states that he reduced it to 100 mg last week.  -increase intensity of urine testing, sending confirmatory tests for quantitative benzodiazepine and opioid analysis, consider hair testing, will have urine testing observed in future due to history of using devices to bypass drug tests. Patient appears to have relapsed but confirmatory testing could be useful in confronting him, although he may be bypassing urine tests and using a nondetectable substance (bath salts, various stimulants, psychedelics, synthetic cannabinoids, etc)  -recommended patient set up appointment with medical doctor to determine whether there is any medical issues contributing to his intoxicated appearance.  -continue ABU treatment for now, patient already on probation due to erratic behavior and compliance issues, pt informed of probation last week. Will discuss with full treatment team tomorrow his behavior and reports by family that has relapsed.       Patient's Intervention Response: reluctant      PSYCHOTHERAPY ADD-ON +[id number]   30 (16-37*) minutes    Site: [facility]  Time: 40 minutes  Participants: Met with patient , mother and father  Therapeutic Intervention Type: family therapy  Why chosen therapy is appropriate versus another modality: relevant to diagnosis    Target symptoms: substance abuse  Primary focus: intoxicated appearance, aberrant behaviors, risk for relapse  Psychotherapeutic techniques: psychoeducation, active listening, facilitation of communication    Outcome monitoring methods: self-report, observation, feedback from  family    Patient's response to intervention:  The patient's response to intervention is guarded.    Progress toward goals:  The patient's progress toward goals is limited.        Karla Doe MD

## 2017-02-22 NOTE — MEDICAL/APP STUDENT
Patient Encounter/Observation:    Observed by Medical Student: Kellen Stephens  Group Session from 1-2pm on Tuesday, February 21, 2017    During the afternoon group session from 1-2pm, Osman walked out of the door immediately after group session began. From my vantage point in the room, I was unable to see whether this was the hallway or bathroom door. Based on the fact that the door opened again 5 minutes later and then another door opened and closed, I believe that the first door was the hallway and the second door was the bathroom. During the next 5-10 minutes the bathroom was quiet and people were continuing to speak during the session. Then the water was turned on in the bathroom and was heard quite loud throughout the room. It sounded as if the faucet was turned on full force or a shower had been turned on, rather than just turning the faucet on to wash your hands, with new audible breaks in the stream of the water. The rest of the ABU IOP group gave some puzzled looks about what was occurring but continued on with the discussion. Osman eventually entered back into the room approximately 25-30 minutes into the group session. He only had briefly sat down in his chair again (5 minutes or less) before he got back up and left (unknown which door). He then returned about 5 minutes later. At that point in time, one of the other individuals in the IOP group directly asked him if he was okay. He responded that he was okay and was slouched in the chair. He continued to seem distracted throughout the remainder of the time that I was there but would interrupt some of the other patients to interject his questions about their situation.  I had to leave after 45 minutes into the group session and am unable to comment on the last 15 minutes.

## 2017-02-22 NOTE — PROGRESS NOTES
PSYCHIATRY  ABU Partial Hospitalization  PROGRESS NOTE/Discharge summary    Patient Name: Polo Kauffman  2017  11:27 AM  Start Date: 2017  : 1985    Status: Intensive Outpatient Program (IOP)  CC: Opioid Use Disorder      Summary of treatment to date:  Patient was admitted on  to address opioid use disorder, in early remission as well as abuse of other substances. Received second injection of vivitrol. Was continue on remeron, cymbalta and baclofen. Gabapentin was titrated up 600 mg tid, patient falsely claimed it was increased to 900 mg tid by our staff. Patient was started on low dose prazosin (1 mg then 2 mg) to address the PTSD sx he complained of, including nightmares, anxiety, intrusive recollections. He was also started on vyvanse 50 mg for hx of ADHD and complaints of inattention, which was later icreased to 70 mg daily due to complaints of inefficacy. Pt repeated asked for adderall IR or XR, due to perceived greater efficacy, which was refused due to hx of polysubstance use including inhaling various substances. Of note, requested patient to bring in bottle of 50 mg vyvanse once dose was increaed which he repeatedly forgot to do, Ultimately brought in medicatin 2 weeks later with 8 fewer pills than should have been present. Initially participated in groups appropriately, however over week prior to discharge patient began to disappear, spend large amounts of time in the bathroom, and showed intoxicated appearanc that would fluctuate throughtout the day and according to staff observers he would appear more sedated and nod off after leaving the bathroom. Family and girlfriend also began to alert staff to odd behaviors, spending time in car late at night (previous place of use), slurred speech, hallucinations, odd behaviors,and eventually witnessing him abuse xanax bars. Patient placed on probation, however he continued to deny any substance use, even after family and staff  confronted him.     SUBJECTIVE:  Pt was confronted today about his behaviour, appearance and symptoms that he has been showing while here on the ABU. Multiple staff have noted that pt is slurring his words, he has been falling asleep during group, has been confused and has not been able to walk in a straight line. Pt has been noted to go to the bathroom for prolonged periods of time, and running the water excessively. Pt has a hxof using urine that is not his own for urine drug screens. His girlfriend has called us to say that she has found xanbars around the house. In addition Monday evening when he thought she was asleep, she saw him grind up a xanbar and snort it. She let us know he still has a  in his car. He has been going out to his car at unusual times, such as in the rain, and his girlfriend has found this behaviour concerning. His parents have also called to say that his behaviour has been unusual, that he has not been acting like himself, he has hallucinated, and they are concerned he is abusing something. Pt is adament that he is not using any substances, and is offended that we think he may be relapsing. He attributes his unusual behaviour to the medication that has been starting during the program. The pt is prescribed vyvance, zoloft, remeron, gabapentin and prazosin. These are not medications that would cause symptoms which mimic intoxication. Pt feels that his increase in gabapentin is responsible for his symptoms. Pt was started on gabapentin 300mg PO bid, increased to 600mg PO bid, 1/30/2017. We discussed that gabapentin does not have side effects that result in symptoms, that appear like intoxication. Pt disagreed, however as a team, we felt he had to be discharged from the program due to his behaviour which was consistent with relapse, was disruptive the milieu and which could not be effectively treated at this level of care, particularly as he is not forthcoming.         Medication Side  Effects: denied  Cravings: no    Scheduled Meds:   Current Outpatient Prescriptions on File Prior to Encounter   Medication Sig Dispense Refill    abatacept (ORENCIA) 125 mg/mL Syrg Inject 125 mg into the skin every 7 days. 4 mL 5    acetaminophen (TYLENOL) 500 MG tablet Take 1 tablet (500 mg total) by mouth every 4 (four) hours as needed for Pain. Do not take more than 3000 mg (6 tablets) daily.  0    baclofen (LIORESAL) 10 MG tablet Take 1 tablet (10 mg total) by mouth 2 (two) times daily. 60 tablet 2    calcium-vitamin D (OSCAL) 250 (625)-125 mg-unit per tablet Take 1 tablet by mouth once daily.      gabapentin (NEURONTIN) 300 MG capsule Take 1 capsule (300 mg total) by mouth 3 (three) times daily. 90 capsule 2    ibuprofen (ADVIL,MOTRIN) 600 MG tablet Take 1 tablet (600 mg total) by mouth every 6 (six) hours as needed for Pain. 120 tablet 2    LACTOBAC CMB #3/FOS/PANTETHINE (PROBIOTIC & ACIDOPHILUS ORAL) Take by mouth.      lisdexamfetamine (VYVANSE) 70 MG capsule Take 1 capsule (70 mg total) by mouth every morning. 30 capsule 0    mirtazapine (REMERON) 15 MG tablet       MULTIVIT-IRON-MIN-FOLIC ACID 3,500-18-0.4 UNIT-MG-MG ORAL CHEW Take by mouth.      naltrexone microspheres 380 mg SSRR kit Inject 1 each (380 mg total) into the muscle once. 1 each 0    prazosin (MINIPRESS) 1 MG Cap Take 1 capsule daily at bedtime, after 3 days increase to 2 capsules at bedtime. 60 capsule 1    sildenafil (VIAGRA) 100 MG tablet Take 1 tablet (100 mg total) by mouth daily as needed for Erectile Dysfunction. Take 1 hour before intercourse. 10 tablet 11     No current facility-administered medications on file prior to encounter.      ALLERGIES:  No Known Allergies    Psychiatric Review Of Systems - Is patient experiencing or having changes in:  sleep: no   appetite: denied  Weight: lost 50# since beginning rehab  energy/anergy: denied  interest/pleasure/anhedonia: denied  somatic symptoms: denied  libido:  "denied  anxiety/panic: no, improved basal level of anxiety on Gabapentin  guilty/hopelessness: denied  concentration: denied  S.I.B.s/risky behavior: denied  Irritability: denied  Racing thoughts: denied  Impulsive behaviors: denied  Paranoia: denied  AVH: denied    Medical ROS:  See Dr. Vilchis's Admission Note of date 1/26/2017 for ROS.     OBJECTIVE:  There were no vitals filed for this visit.    Mental Status Exam:  Appearance: young white male wearing casual clothes  Behavior: cooperative, but defensive  Speech: normal rate, tone, and volume  Language: fluent  Mood: ok  Affect: full  Thought Process: linear  Thought Perceptions: denied AVH  Thought Content: denied SI, HI; no delusions apparent  Sensorium: awake, alert  Attention/Concentration: intact to conversation  Orientation: person, place, time, and situation  Memory: intact (recent, remote)  Abstraction: intact   Insight: fair  Judgment: fair      ASSESSMENT/PLAN:  Opiate Use Disorder, severe  Unspecified Anxiety Disorder  Attention Deficit Disorder    Cluster B traits:  Narcissisism    R/O Sedative Use Disorder    Status:  Continue treatment on ABU    Plan:   Discharge pt from ABU program today, with referral to inpt rehab program which is more likely to benefit pt.      Patient's Intervention Response: unmotivated    Medications:    As per discussion yesterday, patient should stop vyvanse and prazosin, continue gabapentin at 600 mg tid, remeron 15 mg qhs and zoloft 100 mg daily.     Additional Comments:     Pt was discharged from program today due to suspected relapse and reported use of xanbars. Recommended referral to inpt rehab, however pt stated he would " figure it out" and did not accept referrals.    Pt seen and discussed with Dr. Brook Price MD MPH  [facility]  [facility]    Attending Attestation:    I have independently evaluated the patient and discussed the case with the resident. I have reviewed this note and agree with " its contents, as well as the assessment and plan.     Karla Doe MD

## 2017-02-24 LAB
CODEINE UR-MCNC: NEGATIVE NG/ML
CODEINE UR-MCNC: NEGATIVE NG/ML
HYDROCODONE UR-MCNC: NEGATIVE NG/ML
HYDROMORPHONE UR-MCNC: NEGATIVE NG/ML
MORPHINE UR-MCNC: NEGATIVE NG/ML
NALOXONE BY LS MS/MS: NEGATIVE NG/ML
NORHYDROCODONE BY LC MS/MS: NEGATIVE NG/ML
NOROXYCODONE BY LC-MS/MS: NEGATIVE NG/ML
NOROXYMORPHONE BY LC-MS/MS: NEGATIVE NG/ML
OXYCODONE UR-MCNC: NEGATIVE NG/ML
OXYCODONE UR-MCNC: NEGATIVE NG/ML
TOXICOLOGIST REVIEW: NORMAL

## 2017-02-26 LAB
7AMINOCLONAZEPAM UR CFM-MCNC: NEGATIVE NG/ML
7AMINOFLUNITRAZEPAM UR CFM-MCNC: NEGATIVE NG/ML
A-OH ALPRAZ UR CFM-MCNC: NEGATIVE NG/ML
A-OH-TRIAZOLAM UR CFM-MCNC: NEGATIVE NG/ML
BENZODIAZEPINE CONF CHAIN OF CUSTODY: NORMAL
CODEINE UR-MCNC: NEGATIVE NG/ML
CODEINE UR-MCNC: NEGATIVE NG/ML
HYDROCODONE UR-MCNC: NEGATIVE NG/ML
HYDROMORPHONE UR-MCNC: NEGATIVE NG/ML
LORAZEPAM UR CFM-MCNC: NEGATIVE NG/ML
MORPHINE UR-MCNC: NEGATIVE NG/ML
NALOXONE BY LS MS/MS: NEGATIVE NG/ML
NORDIAZEPAM UR CFM-MCNC: NEGATIVE NG/ML
NORHYDROCODONE BY LC MS/MS: NEGATIVE NG/ML
NOROXYCODONE BY LC-MS/MS: NEGATIVE NG/ML
NOROXYMORPHONE BY LC-MS/MS: NEGATIVE NG/ML
OH-ETHYLFLURAZ UR CFM-MCNC: NEGATIVE NG/ML
OXAZEPAM UR CFM-MCNC: NEGATIVE NG/ML
OXYCODONE UR-MCNC: NEGATIVE NG/ML
OXYCODONE UR-MCNC: NEGATIVE NG/ML
TEMAZEPAM UR CFM-MCNC: NEGATIVE NG/ML
TOXICOLOGIST REVIEW: NORMAL
TOXICOLOGIST REVIEW: NORMAL

## 2017-02-27 LAB
7AMINOCLONAZEPAM UR CFM-MCNC: NEGATIVE NG/ML
7AMINOFLUNITRAZEPAM UR CFM-MCNC: NEGATIVE NG/ML
A-OH ALPRAZ UR CFM-MCNC: NEGATIVE NG/ML
A-OH-TRIAZOLAM UR CFM-MCNC: NEGATIVE NG/ML
BENZODIAZEPINE CONF CHAIN OF CUSTODY: NORMAL
LORAZEPAM UR CFM-MCNC: NEGATIVE NG/ML
NORDIAZEPAM UR CFM-MCNC: NEGATIVE NG/ML
OH-ETHYLFLURAZ UR CFM-MCNC: NEGATIVE NG/ML
OXAZEPAM UR CFM-MCNC: NEGATIVE NG/ML
TEMAZEPAM UR CFM-MCNC: NEGATIVE NG/ML
TOXICOLOGIST REVIEW: NORMAL

## 2017-03-08 RX ORDER — LISDEXAMFETAMINE DIMESYLATE 70 MG/1
70 CAPSULE ORAL EVERY MORNING
Qty: 30 CAPSULE | Refills: 0 | OUTPATIENT
Start: 2017-03-08

## 2017-04-21 ENCOUNTER — HOSPITAL ENCOUNTER (INPATIENT)
Facility: OTHER | Age: 32
LOS: 1 days | Discharge: HOME | DRG: 897 | End: 2017-04-22
Attending: EMERGENCY MEDICINE | Admitting: EMERGENCY MEDICINE
Payer: COMMERCIAL

## 2017-04-21 ENCOUNTER — TELEPHONE (OUTPATIENT)
Dept: INTERNAL MEDICINE | Facility: CLINIC | Age: 32
End: 2017-04-21

## 2017-04-21 DIAGNOSIS — F13.930 BENZODIAZEPINE WITHDRAWAL, UNCOMPLICATED: Primary | ICD-10-CM

## 2017-04-21 DIAGNOSIS — R56.9 SEIZURE: ICD-10-CM

## 2017-04-21 PROBLEM — F13.939 BENZODIAZEPINE WITHDRAWAL: Status: ACTIVE | Noted: 2017-04-21

## 2017-04-21 LAB
ALBUMIN SERPL BCP-MCNC: 4.6 G/DL
ALP SERPL-CCNC: 85 U/L
ALT SERPL W/O P-5'-P-CCNC: 36 U/L
AMORPH CRY URNS QL MICRO: ABNORMAL
AMPHET+METHAMPHET UR QL: NEGATIVE
ANION GAP SERPL CALC-SCNC: 10 MMOL/L
AST SERPL-CCNC: 29 U/L
BACTERIA #/AREA URNS HPF: ABNORMAL /HPF
BARBITURATES UR QL SCN>200 NG/ML: NEGATIVE
BASOPHILS # BLD AUTO: 0.01 K/UL
BASOPHILS NFR BLD: 0.1 %
BENZODIAZ UR QL SCN>200 NG/ML: NEGATIVE
BILIRUB SERPL-MCNC: 0.4 MG/DL
BILIRUB UR QL STRIP: NEGATIVE
BUN SERPL-MCNC: 15 MG/DL
BZE UR QL SCN: NEGATIVE
CALCIUM SERPL-MCNC: 9.6 MG/DL
CANNABINOIDS UR QL SCN: NEGATIVE
CHLORIDE SERPL-SCNC: 103 MMOL/L
CLARITY UR: ABNORMAL
CO2 SERPL-SCNC: 25 MMOL/L
COLOR UR: YELLOW
CREAT SERPL-MCNC: 1.2 MG/DL
CREAT UR-MCNC: 44.6 MG/DL
DIFFERENTIAL METHOD: ABNORMAL
EOSINOPHIL # BLD AUTO: 0 K/UL
EOSINOPHIL NFR BLD: 0 %
ERYTHROCYTE [DISTWIDTH] IN BLOOD BY AUTOMATED COUNT: 14 %
EST. GFR  (AFRICAN AMERICAN): >60 ML/MIN/1.73 M^2
EST. GFR  (NON AFRICAN AMERICAN): >60 ML/MIN/1.73 M^2
ETHANOL SERPL-MCNC: <10 MG/DL
GLUCOSE SERPL-MCNC: 126 MG/DL
GLUCOSE UR QL STRIP: NEGATIVE
HCT VFR BLD AUTO: 44.2 %
HGB BLD-MCNC: 15.6 G/DL
HGB UR QL STRIP: NEGATIVE
INR PPP: 0.9
KETONES UR QL STRIP: NEGATIVE
LEUKOCYTE ESTERASE UR QL STRIP: NEGATIVE
LIPASE SERPL-CCNC: 20 U/L
LYMPHOCYTES # BLD AUTO: 1 K/UL
LYMPHOCYTES NFR BLD: 11.2 %
MAGNESIUM SERPL-MCNC: 2.5 MG/DL
MCH RBC QN AUTO: 31.5 PG
MCHC RBC AUTO-ENTMCNC: 35.3 %
MCV RBC AUTO: 89 FL
METHADONE UR QL SCN>300 NG/ML: NEGATIVE
MICROSCOPIC COMMENT: ABNORMAL
MONOCYTES # BLD AUTO: 0.7 K/UL
MONOCYTES NFR BLD: 7.2 %
NEUTROPHILS # BLD AUTO: 7.3 K/UL
NEUTROPHILS NFR BLD: 81.2 %
NITRITE UR QL STRIP: POSITIVE
OPIATES UR QL SCN: NEGATIVE
PCP UR QL SCN>25 NG/ML: NEGATIVE
PH UR STRIP: 7 [PH] (ref 5–8)
PLATELET # BLD AUTO: 309 K/UL
PMV BLD AUTO: 9.5 FL
POCT GLUCOSE: 123 MG/DL (ref 70–110)
POTASSIUM SERPL-SCNC: 4.7 MMOL/L
PROT SERPL-MCNC: 8.4 G/DL
PROT UR QL STRIP: NEGATIVE
PROTHROMBIN TIME: 10.4 SEC
RBC # BLD AUTO: 4.96 M/UL
SODIUM SERPL-SCNC: 138 MMOL/L
SP GR UR STRIP: <=1.005 (ref 1–1.03)
TOXICOLOGY INFORMATION: NORMAL
URN SPEC COLLECT METH UR: ABNORMAL
UROBILINOGEN UR STRIP-ACNC: NEGATIVE EU/DL
WBC # BLD AUTO: 9.02 K/UL

## 2017-04-21 PROCEDURE — 63600175 PHARM REV CODE 636 W HCPCS: Performed by: PHYSICIAN ASSISTANT

## 2017-04-21 PROCEDURE — 96375 TX/PRO/DX INJ NEW DRUG ADDON: CPT

## 2017-04-21 PROCEDURE — 81000 URINALYSIS NONAUTO W/SCOPE: CPT

## 2017-04-21 PROCEDURE — 11000001 HC ACUTE MED/SURG PRIVATE ROOM

## 2017-04-21 PROCEDURE — 25000003 PHARM REV CODE 250: Performed by: PHYSICIAN ASSISTANT

## 2017-04-21 PROCEDURE — 99223 1ST HOSP IP/OBS HIGH 75: CPT | Mod: ,,, | Performed by: PHYSICIAN ASSISTANT

## 2017-04-21 PROCEDURE — 82962 GLUCOSE BLOOD TEST: CPT

## 2017-04-21 PROCEDURE — 87186 SC STD MICRODIL/AGAR DIL: CPT

## 2017-04-21 PROCEDURE — 80053 COMPREHEN METABOLIC PANEL: CPT

## 2017-04-21 PROCEDURE — 96372 THER/PROPH/DIAG INJ SC/IM: CPT

## 2017-04-21 PROCEDURE — 25000003 PHARM REV CODE 250: Performed by: EMERGENCY MEDICINE

## 2017-04-21 PROCEDURE — 83690 ASSAY OF LIPASE: CPT

## 2017-04-21 PROCEDURE — 85025 COMPLETE CBC W/AUTO DIFF WBC: CPT

## 2017-04-21 PROCEDURE — 87077 CULTURE AEROBIC IDENTIFY: CPT | Mod: 59

## 2017-04-21 PROCEDURE — 80320 DRUG SCREEN QUANTALCOHOLS: CPT

## 2017-04-21 PROCEDURE — 87086 URINE CULTURE/COLONY COUNT: CPT

## 2017-04-21 PROCEDURE — 85610 PROTHROMBIN TIME: CPT

## 2017-04-21 PROCEDURE — 96361 HYDRATE IV INFUSION ADD-ON: CPT

## 2017-04-21 PROCEDURE — 87088 URINE BACTERIA CULTURE: CPT

## 2017-04-21 PROCEDURE — 96374 THER/PROPH/DIAG INJ IV PUSH: CPT

## 2017-04-21 PROCEDURE — 63600175 PHARM REV CODE 636 W HCPCS: Performed by: EMERGENCY MEDICINE

## 2017-04-21 PROCEDURE — 82570 ASSAY OF URINE CREATININE: CPT

## 2017-04-21 PROCEDURE — 63600175 PHARM REV CODE 636 W HCPCS

## 2017-04-21 PROCEDURE — 83735 ASSAY OF MAGNESIUM: CPT

## 2017-04-21 PROCEDURE — 80307 DRUG TEST PRSMV CHEM ANLYZR: CPT

## 2017-04-21 PROCEDURE — 99285 EMERGENCY DEPT VISIT HI MDM: CPT | Mod: 25

## 2017-04-21 RX ORDER — ONDANSETRON 2 MG/ML
4 INJECTION INTRAMUSCULAR; INTRAVENOUS EVERY 8 HOURS PRN
Status: DISCONTINUED | OUTPATIENT
Start: 2017-04-21 | End: 2017-04-22 | Stop reason: HOSPADM

## 2017-04-21 RX ORDER — GABAPENTIN 300 MG/1
600 CAPSULE ORAL 3 TIMES DAILY
Status: DISCONTINUED | OUTPATIENT
Start: 2017-04-21 | End: 2017-04-22 | Stop reason: HOSPADM

## 2017-04-21 RX ORDER — LORAZEPAM 2 MG/ML
INJECTION INTRAMUSCULAR
Status: COMPLETED
Start: 2017-04-21 | End: 2017-04-21

## 2017-04-21 RX ORDER — FOLIC ACID 1 MG/1
1 TABLET ORAL DAILY
Status: DISCONTINUED | OUTPATIENT
Start: 2017-04-22 | End: 2017-04-22 | Stop reason: HOSPADM

## 2017-04-21 RX ORDER — BUPROPION HYDROCHLORIDE 150 MG/1
150 TABLET ORAL EVERY MORNING
COMMUNITY

## 2017-04-21 RX ORDER — ONDANSETRON 2 MG/ML
4 INJECTION INTRAMUSCULAR; INTRAVENOUS EVERY 12 HOURS PRN
Status: DISCONTINUED | OUTPATIENT
Start: 2017-04-21 | End: 2017-04-22 | Stop reason: HOSPADM

## 2017-04-21 RX ORDER — ALPRAZOLAM 0.5 MG/1
0.5 TABLET ORAL 3 TIMES DAILY
Status: DISCONTINUED | OUTPATIENT
Start: 2017-04-21 | End: 2017-04-22 | Stop reason: HOSPADM

## 2017-04-21 RX ORDER — BUPROPION HYDROCHLORIDE 150 MG/1
150 TABLET ORAL EVERY MORNING
Status: DISCONTINUED | OUTPATIENT
Start: 2017-04-22 | End: 2017-04-22 | Stop reason: HOSPADM

## 2017-04-21 RX ORDER — GABAPENTIN 600 MG/1
600 TABLET ORAL 3 TIMES DAILY
COMMUNITY

## 2017-04-21 RX ORDER — ENOXAPARIN SODIUM 100 MG/ML
40 INJECTION SUBCUTANEOUS EVERY 24 HOURS
Status: DISCONTINUED | OUTPATIENT
Start: 2017-04-21 | End: 2017-04-22 | Stop reason: HOSPADM

## 2017-04-21 RX ORDER — SODIUM CHLORIDE 9 MG/ML
1000 INJECTION, SOLUTION INTRAVENOUS
Status: COMPLETED | OUTPATIENT
Start: 2017-04-21 | End: 2017-04-21

## 2017-04-21 RX ORDER — LORAZEPAM 2 MG/ML
1 INJECTION INTRAMUSCULAR
Status: COMPLETED | OUTPATIENT
Start: 2017-04-21 | End: 2017-04-21

## 2017-04-21 RX ORDER — ACETAMINOPHEN 325 MG/1
650 TABLET ORAL EVERY 8 HOURS PRN
Status: DISCONTINUED | OUTPATIENT
Start: 2017-04-21 | End: 2017-04-22 | Stop reason: HOSPADM

## 2017-04-21 RX ORDER — DICYCLOMINE HYDROCHLORIDE 10 MG/ML
20 INJECTION INTRAMUSCULAR
Status: COMPLETED | OUTPATIENT
Start: 2017-04-21 | End: 2017-04-21

## 2017-04-21 RX ORDER — MIRTAZAPINE 15 MG/1
15 TABLET, FILM COATED ORAL NIGHTLY
Status: ON HOLD | COMMUNITY
End: 2017-04-22 | Stop reason: HOSPADM

## 2017-04-21 RX ORDER — LORAZEPAM 2 MG/ML
1 INJECTION INTRAMUSCULAR
Status: DISCONTINUED | OUTPATIENT
Start: 2017-04-21 | End: 2017-04-22 | Stop reason: HOSPADM

## 2017-04-21 RX ORDER — IBUPROFEN 800 MG/1
800 TABLET ORAL EVERY 8 HOURS PRN
COMMUNITY

## 2017-04-21 RX ORDER — LEVOMEFOLATE CALCIUM 15 MG
1 TABLET ORAL EVERY MORNING
COMMUNITY

## 2017-04-21 RX ADMIN — LORAZEPAM 1 MG: 2 INJECTION INTRAMUSCULAR at 02:04

## 2017-04-21 RX ADMIN — DICYCLOMINE HYDROCHLORIDE 20 MG: 10 INJECTION INTRAMUSCULAR at 01:04

## 2017-04-21 RX ADMIN — ALPRAZOLAM 0.5 MG: 0.5 TABLET ORAL at 09:04

## 2017-04-21 RX ADMIN — ONDANSETRON 4 MG: 2 INJECTION, SOLUTION INTRAMUSCULAR; INTRAVENOUS at 03:04

## 2017-04-21 RX ADMIN — LORAZEPAM 1 MG: 2 INJECTION, SOLUTION INTRAMUSCULAR; INTRAVENOUS at 02:04

## 2017-04-21 RX ADMIN — SODIUM CHLORIDE 1000 ML: 0.9 INJECTION, SOLUTION INTRAVENOUS at 01:04

## 2017-04-21 RX ADMIN — SODIUM CHLORIDE 1000 ML: 0.9 INJECTION, SOLUTION INTRAVENOUS at 02:04

## 2017-04-21 RX ADMIN — GABAPENTIN 600 MG: 300 CAPSULE ORAL at 09:04

## 2017-04-21 RX ADMIN — ALPRAZOLAM 0.5 MG: 0.5 TABLET ORAL at 03:04

## 2017-04-21 RX ADMIN — ENOXAPARIN SODIUM 40 MG: 100 INJECTION SUBCUTANEOUS at 06:04

## 2017-04-21 NOTE — IP AVS SNAPSHOT
[facility] [place] [facility] ([facility])  [address] [place], [place] [address]  Phone: [phone]           Patient Discharge Instructions   Our goal is to set you up for success. This packet includes information on your condition, medications, and your home care.  It will help you care for yourself to prevent having to return to the hospital.     Please ask your nurse if you have any questions.      There are many details to remember when preparing to leave the hospital. Here is what you will need to do:    1. Take your medicine. If you are prescribed medications, review your Medication List on the following pages. You may have new medications to  at the pharmacy and others that you'll need to stop taking. Review the instructions for how and when to take your medications. Talk with your doctor or nurses if you are unsure of what to do.     2. Go to your follow-up appointments. Specific follow-up information is listed in the following pages. Your may be contacted by a nurse or clinical provider about future appointments. Be sure we have all of the phone numbers to reach you. Please contact your provider's office if you are unable to make an appointment.     3. Watch for warning signs. Your doctor or nurse will give you detailed warning signs to watch for and when to call for assistance. These instructions may also include educational information about your condition. If you experience any of warning signs to your health, call your doctor.           [facility]  Unless otherwise directed by your provider, please   contact [facility], our nurse care line   that is available for 24/7 assistance.     [phone] (toll-free)     Registered nurses in the [facility]   provide: appointment scheduling, clinical advisement, health education, and other advisory services.                  ** Verify the list of medication(s) below is accurate and up to date. Carry this with you in case of  emergency. If your medications have changed, please notify your healthcare provider.             Medication List      START taking these medications        Additional Info                      alprazolam 0.5 MG tablet   Commonly known as:  XANAX   Quantity:  15 tablet   Refills:  0   Dose:  0.5 mg    Last time this was given:  0.5 mg on 4/22/2017  6:15 AM   Instructions:  Take 1 tablet (0.5 mg total) by mouth 3 (three) times daily as needed for Anxiety.     Begin Date    AM    Noon    PM    Bedtime         CHANGE how you take these medications        Additional Info                      mirtazapine 15 MG tablet   Commonly known as:  REMERON   Refills:  0   Dose:  15 mg   What changed:  Another medication with the same name was removed. Continue taking this medication, and follow the directions you see here.    Instructions:  Take 15 mg by mouth every evening.     Begin Date    AM    Noon    PM    Bedtime         CONTINUE taking these medications        Additional Info                      abatacept 125 mg/mL Syrg   Refills:  0   Dose:  125 mg    Instructions:  Inject 125 mg into the skin every 7 days.     Begin Date    AM    Noon    PM    Bedtime       buPROPion 150 MG TB24 tablet   Commonly known as:  WELLBUTRIN XL   Refills:  0   Dose:  150 mg    Last time this was given:  150 mg on 4/22/2017  6:15 AM   Instructions:  Take 150 mg by mouth every morning.     Begin Date    AM    Noon    PM    Bedtime       gabapentin 600 MG tablet   Commonly known as:  NEURONTIN   Refills:  0   Dose:  600 mg    Instructions:  Take 600 mg by mouth 3 (three) times daily.     Begin Date    AM    Noon    PM    Bedtime       ibuprofen 800 MG tablet   Commonly known as:  ADVIL,MOTRIN   Refills:  0   Dose:  800 mg    Instructions:  Take 800 mg by mouth every 8 (eight) hours as needed.     Begin Date    AM    Noon    PM    Bedtime       levomefolate calcium 15 mg Tab   Refills:  0   Dose:  1 tablet    Instructions:  Take 1 tablet by mouth  every morning.     Begin Date    AM    Noon    PM    Bedtime       ZUBSOLV 5.7-1.4 mg Subl   Refills:  0   Dose:  1 tablet   Generic drug:  buprenorphine-naloxone    Instructions:  Place 1 tablet under the tongue once daily.     Begin Date    AM    Noon    PM    Bedtime         STOP taking these medications     naltrexone microspheres 380 mg Ssrr kit            Where to Get Your Medications      You can get these medications from any pharmacy     Bring a paper prescription for each of these medications     alprazolam 0.5 MG tablet                  Please bring to all follow up appointments:    1. A copy of your discharge instructions.  2. All medicines you are currently taking in their original bottles.  3. Identification and insurance card.    Please arrive 15 minutes ahead of scheduled appointment time.    Please call 24 hours in advance if you must reschedule your appointment and/or time.        Follow-up Information     Schedule an appointment as soon as possible for a visit with Brian Steel MD.    Specialty:  Internal Medicine    Contact information:    [address]  [facility] [address]  [phone]          Schedule an appointment as soon as possible for a visit with Dom Vilchis MD.    Specialties:  Psychiatry, Addiction Medicine    Contact information:    [address]  [facility] [address]  [phone]          Discharge Instructions     Future Orders    Activity as tolerated     Call MD for:  persistent nausea and vomiting or diarrhea     Call MD for:  severe uncontrolled pain     Call MD for:  temperature >100.4     Diet general     Questions:    Total calories:      Fat restriction, if any:      Protein restriction, if any:      Na restriction, if any:      Fluid restriction:      Additional restrictions:          Primary Diagnosis     Your primary diagnosis was:  Benzodiazepine Withdrawal      Admission Information     Date & Time Provider Department [facility]    4/21/2017 12:14 PM  Mike Velasco MD [facility] [id number]      Care Providers     Provider Role Specialty Primary office phone    Mike Velasco MD Attending Provider Hospitalist [phone]    Joshua Lorenzo MD Consulting Physician  Neurology [phone]      Your Vitals Were     BP Pulse Temp Resp Height Weight    121/64 (BP Location: Right arm, Patient Position: Lying, BP Method: Automatic) 71 98.6 °F (37 °C) (Oral) 18 6' (1.829 m) 97.5 kg (215 lb)    SpO2 BMI             97% 29.16 kg/m2         Recent Lab Values     No lab values to display.      Pending Labs     Order Current Status    Urine culture In process      Allergies as of 4/22/2017     No Known Allergies      Advance Directives     An advance directive is a document which, in the event you are no longer able to make decisions for yourself, tells your healthcare team what kind of treatment you do or do not want to receive, or who you would like to make those decisions for you.  If you do not currently have an advance directive, [facility] encourages you to create one.  For more information call:  [phone]), [phone] ([phone]),  or log on to [email or web address].        Language Assistance Services     ATTENTION: Language assistance services are available, free of charge. Please call [phone].      ATENCIÓN: Si habla español, tiene a koenig disposición servicios gratuitos de asistencia lingüística. Llame al [phone].     CHÚ Ý: N?u b?n nói Ti?ng Vi?t, có các d?ch v? h? tr? ngôn ng? mi?n phí dành cho b?n. G?i s? [phone].         [facility] complies with applicable Federal civil rights laws and does not discriminate on the basis of race, color, national origin, age, disability, or sex.

## 2017-04-21 NOTE — NURSING
Pt transferred from ED AAOX4 and in NAD. Room orientation given. Bed alarm on. Pt encouraged to call for assistance when needing to get out of bed. Pt refused dinner. Seizure precautions setup. Tele monitoring in place. Call bell in reach.

## 2017-04-21 NOTE — ED NOTES
Dr. Mcfarland from [facility] called for an update on the pt. Verified with pt that it was ok to discuss his care.

## 2017-04-21 NOTE — ED NOTES
Pt still with nystagmus and frequent twitching/jerking movements. MARQUEZ Banerjee notified. Pt and family updated on plan of care. Pt continues to feel tired.

## 2017-04-21 NOTE — TELEPHONE ENCOUNTER
"----- Message from Radha Duffy sent at 4/21/2017 12:03 PM CDT -----  Contact: Patient's father / Polo Kauffman . / # [phone]  X  1st Request  _  2nd Request  _  3rd Request    Who: Polo Brewer Daly (mrn# [id number])    Why: Patient's father called requesting a call.  Says, "patient is on his way to [facility] in an ambulance and would like a call back."  THANKS!    What Number to Call Back:  # [phone]    When to Expect a call back: (Before the end of the day)   -- if the call is after 12:00, the call back will be tomorrow.                        "

## 2017-04-21 NOTE — ED TRIAGE NOTES
Pt reports having a 45 second seizure while at his outpatient rehab program. Pt reports bystanders said he was sitting in chair and started jerking. Denies falling or hitting head. Pt reports last used opioids on Wednesday. Pt reports prior seizure last fall after using opioids. States that he bounced around doctors and was cleared from needing seizure medications. Pt denies any aura.

## 2017-04-21 NOTE — ED PROVIDER NOTES
"Encounter Date: 4/21/2017    SCRIBE #1 NOTE: I, Ethan Vazquez, am scribing for, and in the presence of,  Dr. Funes. I have scribed the entire note.       History     Chief Complaint   Patient presents with    Seizures     pt was at drug rehab center had a witnessed seizure, tonic clonic did not hit his head.      Review of patient's allergies indicates:  No Known Allergies  HPI Comments: Time seen by provider: 12:57 PM    This is a 31 y.o. male who presents via EMS with complaint of seizures. His father reports that while in drug rehab today ([place]) he had a witnessed seizure lasting approximately 40 seconds. He was shaking during the episode and slightly confused afterwards. He has been taking Opiates for 1 year and Benzodiazepines for 6 months and is now taking a suboxone derivative. He is currently feeling anxious and jittery and has abdominal pain described as "cramping." His girlfriend reports that last week he "relapsed on Suboxone and had then had "heroin like actions" including "not making sense, delirium, lack of sleep and passing out while standing." He denies head trauma, HA, numbness, weakness, SOB and CP. His last benzodiazepine use was 2 days ago. He has a Hx of similar seizures and had a Neurology workup including a sleep study.    The history is provided by the patient, a parent and a significant other.     Past Medical History:   Diagnosis Date    ADD (attention deficit disorder) 6/19/2015    Anxiety     Closed head injury with concussion 2014    Fell and hit head; workup for siezure negative    Depression     Raynaud's phenomenon 6/19/2015    Seronegative rheumatoid arthritis 6/19/2015    2008 polyarthralgia with sl swelling, neg lab MTX [id number] Prednisone [id number] Enbrel 2008-9 Humira 2009-10 Remicade 2010-14 Orencia 2014-present       History reviewed. No pertinent surgical history.  Family History   Problem Relation Age of Onset    No Known Problems Mother     No Known " Problems Father     No Known Problems Sister      Social History   Substance Use Topics    Smoking status: Never Smoker    Smokeless tobacco: Never Used    Alcohol use No     Review of Systems   Constitutional: Negative for chills, diaphoresis and fever.   HENT: Negative for congestion and sore throat.    Eyes: Negative for redness.   Respiratory: Negative for cough, chest tightness, shortness of breath and wheezing.    Cardiovascular: Negative for chest pain, palpitations and leg swelling.   Gastrointestinal: Positive for abdominal pain. Negative for diarrhea, nausea and vomiting.   Genitourinary: Negative for difficulty urinating, dysuria and hematuria.   Musculoskeletal: Negative for back pain, myalgias and neck pain.   Skin: Negative for color change and wound.   Neurological: Positive for seizures. Negative for weakness, numbness and headaches.   Psychiatric/Behavioral: Negative for behavioral problems and confusion. The patient is nervous/anxious.        Physical Exam   Initial Vitals   BP Pulse Resp Temp SpO2   04/21/17 1214 04/21/17 1214 04/21/17 1214 04/21/17 1214 04/21/17 1214   139/68 114 16 98.8 °F (37.1 °C) 95 %     Physical Exam    Nursing note and vitals reviewed.  Constitutional: He appears well-developed and well-nourished. He is not diaphoretic. No distress.   HENT:   Head: Normocephalic and atraumatic.   Mouth/Throat: Oropharynx is clear and moist.   Sticky mucous membranes.    Eyes: Conjunctivae and EOM are normal. Pupils are equal, round, and reactive to light.   Neck: Normal range of motion. Neck supple.   Cardiovascular: Normal rate, regular rhythm, S1 normal, S2 normal and normal heart sounds. Exam reveals no gallop and no friction rub.    No murmur heard.  Pulmonary/Chest: Breath sounds normal. No respiratory distress. He has no wheezes. He has no rhonchi. He has no rales.   Abdominal: Soft. Bowel sounds are normal. He exhibits no distension. There is no tenderness. There is no rebound  and no guarding.   Musculoskeletal: Normal range of motion. He exhibits no edema or tenderness.   No lower extremity edema.    Lymphadenopathy:     He has no cervical adenopathy.   Neurological: He is alert and oriented to person, place, and time. He has normal strength. No sensory deficit.   PERRLA, EOMI.  Strength 5 of 5 throughout.  Two point discrimination is intact throughout.  Sensation intact to light touch throughout.  Reflexes 2+ throughout.  Good finger to nose task ability. Negative pronator drift.  No papilledema.  No meningeal signs.    Skin: Skin is warm and dry. No rash noted. No pallor.   Psychiatric: He has a normal mood and affect. His behavior is normal. Judgment and thought content normal.         ED Course   Procedures  Labs Reviewed   CBC W/ AUTO DIFFERENTIAL - Abnormal; Notable for the following:        Result Value    MCH 31.5 (*)     Gran% 81.2 (*)     Lymph% 11.2 (*)     All other components within normal limits   COMPREHENSIVE METABOLIC PANEL - Abnormal; Notable for the following:     Glucose 126 (*)     All other components within normal limits   URINALYSIS - Abnormal; Notable for the following:     Appearance, UA Hazy (*)     Specific Gravity, UA <=1.005 (*)     Nitrite, UA Positive (*)     All other components within normal limits   URINALYSIS MICROSCOPIC - Abnormal; Notable for the following:     Bacteria, UA Moderate (*)     Amorphous, UA Many (*)     All other components within normal limits   POCT GLUCOSE - Abnormal; Notable for the following:     POCT Glucose 123 (*)     All other components within normal limits   DRUG SCREEN PANEL, URINE EMERGENCY   ALCOHOL,MEDICAL (ETHANOL)   PROTIME-INR   LIPASE   POCT GLUCOSE MONITORING CONTINUOUS             Medical Decision Making:   Clinical Tests:   Lab Tests: Ordered and Reviewed  ED Management:  1:59 PM - Patient seized in the ED.   Other:   I have discussed this case with another health care provider.       <> Summary of the Discussion:  2:26 PM - I discussed the case with the hospitalist who will admit the patient.             Scribe Attestation:   Scribe #1: I performed the above scribed service and the documentation accurately describes the services I performed. I attest to the accuracy of the note.    Attending Attestation:           Physician Attestation for Scribe:  Physician Attestation Statement for Scribe #1: I, Dr. Funes, reviewed documentation, as scribed by Ethan Vazquez in my presence, and it is both accurate and complete.         Attending ED Notes:   Emergent evaluation a 31-year-old male with witnessed seizure activity.  No head trauma.  Patient is afebrile, nontoxic-appearing with stable vital signs except for elevation in heart rate.  Patient is neurovascularly intact and without any focal neurologic deficits.PERRLA, EOMI.  Strength 5 of 5 throughout.  Two point discrimination is intact throughout.  Sensation intact to light touch throughout.  Reflexes 2+ throughout.  Good finger to nose task ability. Negative pronator drift.  No papilledema.  No meningeal signs.  No elevation of white blood cell count.  H&H within normal limits.  No acute findings on CMP.  Lipase is 20.  Alcohol is less than 10. Drug screen is negative.  Urinary analysis reveals moderate bacteria and positive nitrites.  Patient with no urinary complaints.  Urine culture is sent.  Patient did have a seizure witnessed by nursing staff and by me in the emergency room.  No head trauma.  I do suspect patient's seizure activity is secondary to benzodiazepine withdrawal.  The patient is extensively counseled on his diagnosis and treatment including all diagnostic, laboratory and physical exam findings.  The patient is admitted in stable condition.              Note patient has given permission to speak with his mother, father and girlfriend regarding all of his medical care including but not limited to physical exam, review of systems, laboratory and diagnostic  findings and history of present illness.          ED Course     Clinical Impression:     1. Benzodiazepine withdrawal, uncomplicated    2. Seizure               Manny Bearden MD  04/21/17 4607

## 2017-04-21 NOTE — ED NOTES
Patients father asking about patients care.  I spoke with the patient he voiced that is was ok for me to discuss his care with his father.

## 2017-04-21 NOTE — ED NOTES
Rounding on the pt performed. Pt updated on plan of care. Bathroom needs addressed. Denies any current pain and reports improvement in stomach cramping.

## 2017-04-21 NOTE — TELEPHONE ENCOUNTER
FYI pt father call and wanted you to know that his son had another seziure and is getting admitted in hospital.

## 2017-04-22 VITALS
HEIGHT: 72 IN | TEMPERATURE: 99 F | RESPIRATION RATE: 18 BRPM | WEIGHT: 215 LBS | BODY MASS INDEX: 29.12 KG/M2 | SYSTOLIC BLOOD PRESSURE: 121 MMHG | HEART RATE: 71 BPM | OXYGEN SATURATION: 97 % | DIASTOLIC BLOOD PRESSURE: 64 MMHG

## 2017-04-22 LAB
ANION GAP SERPL CALC-SCNC: 11 MMOL/L
BUN SERPL-MCNC: 12 MG/DL
CALCIUM SERPL-MCNC: 8.8 MG/DL
CHLORIDE SERPL-SCNC: 108 MMOL/L
CO2 SERPL-SCNC: 22 MMOL/L
CREAT SERPL-MCNC: 1.1 MG/DL
EST. GFR  (AFRICAN AMERICAN): >60 ML/MIN/1.73 M^2
EST. GFR  (NON AFRICAN AMERICAN): >60 ML/MIN/1.73 M^2
GLUCOSE SERPL-MCNC: 100 MG/DL
PHOSPHATE SERPL-MCNC: 3.3 MG/DL
POTASSIUM SERPL-SCNC: 4.2 MMOL/L
SODIUM SERPL-SCNC: 141 MMOL/L

## 2017-04-22 PROCEDURE — 25000003 PHARM REV CODE 250: Performed by: PHYSICIAN ASSISTANT

## 2017-04-22 PROCEDURE — 99238 HOSP IP/OBS DSCHRG MGMT 30/<: CPT | Mod: ,,, | Performed by: PHYSICIAN ASSISTANT

## 2017-04-22 PROCEDURE — 80048 BASIC METABOLIC PNL TOTAL CA: CPT

## 2017-04-22 PROCEDURE — 84100 ASSAY OF PHOSPHORUS: CPT

## 2017-04-22 PROCEDURE — 36415 COLL VENOUS BLD VENIPUNCTURE: CPT

## 2017-04-22 RX ORDER — ALPRAZOLAM 0.5 MG/1
0.5 TABLET ORAL 3 TIMES DAILY PRN
Qty: 15 TABLET | Refills: 0 | Status: SHIPPED | OUTPATIENT
Start: 2017-04-22

## 2017-04-22 RX ORDER — MIRTAZAPINE 15 MG/1
15 TABLET, FILM COATED ORAL NIGHTLY
COMMUNITY

## 2017-04-22 RX ADMIN — GABAPENTIN 600 MG: 300 CAPSULE ORAL at 06:04

## 2017-04-22 RX ADMIN — ALPRAZOLAM 0.5 MG: 0.5 TABLET ORAL at 06:04

## 2017-04-22 RX ADMIN — BUPROPION HYDROCHLORIDE 150 MG: 150 TABLET, FILM COATED, EXTENDED RELEASE ORAL at 06:04

## 2017-04-22 NOTE — CONSULTS
"[facility] Neurology Consult Note    Reason for Consult - Seizures      History of Present Illness -   Patient is a 31 year old male with a h/o RA, ADD, anxiety and depression, and drug abuse on methadone and benzodiazepines. Patient presents following seizure while he was at Rehab center today ([place]); he had a witnessed seizure lasting approximately 40 seconds. He was shaking during the episode, stiff and slightly confused afterwards. He has been taking Opiates for 1 year and Benzodiazepines for 6 months and is now taking a suboxone derivative. He states that he had another seizure while in ED with same characteristic than previous one. His girlfriend reports that last week he "relapsed on Suboxone and had then had "heroin like actions" including "not making sense, delirium, lack of sleep and passing out while standing." He denies head trauma, HA, numbness, weakness, dizziness, difficulty speaking, numbness or weakness. His last benzodiazepine use was 2 days ago. Patient reports 1 episode of seizure last year 2/2 benzos withdrawal. EEG performed negative.   On my interview patient awake, alert, answering questions appropriately, oriented x4. He states he is feeling better.      Review of Systems -  Pertinent positive noted in HPI.       Past Medical History - As HPI  Social History -   Family History -  Allergies - NKDA  Home Neuro Medications -    Vitals -     Physical Exam -     Vitals:    04/21/17 1736 04/21/17 1833 04/21/17 1900 04/21/17 2000   BP: (!) 122/57 118/63     BP Location:  Left arm     Patient Position:  Lying     BP Method:  Automatic     Pulse: 84 74 75 74   Resp:  18     Temp:  97.8 °F (36.6 °C)     TempSrc:  Oral     SpO2: 97% 100%     Weight:       Height:           General appearance: Well nourished, well developed, no acute distress.  Facial Expression: normal       Affect: full       Orientation to time & place:  Oriented to time, place, person and situation       Attention & concentration:  " Normal attention span and concentration       Memory:  Recent and remote memory intact  Language: Spontaneous, fluent; able to repeat and name objects        Fund of knowledge:  Aware of current events        Speech:  normal (not dysarthric)  Cranial nerves: normal visual acuity, visual fields full, pupils equal round and reactive, extraocular movements intact, facial sensation intact, face symmetrical, hearing intact to whisper, palate raises midline, shoulder shrug strength normal, tongue protrudes midline.  Musculoskeletal:  Muscle tone: all 4 extremities normal        Muscle Bulk: all 4 extremities normal        Muscle strength:  5/5 in all 4 extremities        No pronator drift  Sensation: Intact to light touch, pin prick, vibration in all extremities         Deep tendon Reflexes: 2+ bilateral biceps, triceps, patella, ankles    Cerebellar and Coordination:  Finger-nose: no dysmetria       HTS : Normal      MOVEMENT DISORDERS FOCUSED EXAM    Abnormality of movement (bradykinesia, hyperkinesia) present? No    Tremor present?   No         Assessment and Plan -   31 year old male with a h/o RA, ADD, anxiety and depression, and drug abuse on methadone and benzodiazepines presents following seizure x2 today. No focal neuro deficits on exam. CT head negative.    Seizures 2/2 benzodiazepine withdrawal.  -Similar episode in Nov 2016, he had seizure work up negative.    Recommendations:  -would recommend, once withdrawal symptoms are controlled, tapering benzodiazepines and PRN Ativan 1 mg PO/IM q4H for withdrawal parameters (SBP > 160, DBP > 110, pulse > 110) or seizure activity.  -Psych consult.    Appreciate consult. Please do not hesitate consult should any question arise.    Case discussed with Dr Lazo.

## 2017-04-22 NOTE — DISCHARGE SUMMARY
[facility] Hospital Medicine  Discharge Summary      Patient Name: Polo Kauffman  MRN: [id number]  Admission Date: 4/21/2017  Hospital Length of Stay: 1 days  Discharge Date and Time:  04/22/2017 10:05 AM  Attending Physician: Mike Velasco MD   Discharging Provider: Uvaldo George PA-C  Primary Care Provider: Brian Steel MD      HPI:   30 y/o male with Hx of ADD (attention deficit disorder), Anxiety, Depression;  Seronegative rheumatoid arthritis, Chronic use of opiate for therapeutic purpose and Opioid and benzo abuse and dependence presents to ED s/p seizure. His father reports that while in drug rehab today ([place]) he had a witnessed seizure lasting approximately 40 seconds. States episode of seizure last year secondary to benzos withdrawal. EEG performed was negative. He has been taking Opiates for 1 year and Benzodiazepines for 6 months and is now taking a suboxone derivative. Last benzodiazepine was 2 days ago. Denies head trauma, HA, dizziness; does report nausea.     In ED, patient suffered second seizure.     On my exam, patient alert and oriented x4, mother at bedside      * No surgery found *      Indwelling Lines/Drains at time of discharge:   Lines/Drains/Airways          No matching active lines, drains, or airways        Hospital Course:   31 year old male with a Hx of seronegative RA, ADD, anxiety and depression, and drug abuse on methadone and benzodiazepines presents following seizure x2 today. No focal neuro deficits on exam. Seizures likely secondary to benzodiazepine withdrawal. Similar episode in Nov 2016, he had seizure work up negative. Tapering benzodiazepines.   D/w patient and father plan for follow-up rehab on Monday 4/24/2017.  VSS, no further seizures, discharged.        Consults:   Consults         Status Ordering Provider     Inpatient consult to Neurology  Once     Provider:  Joshua Lorenzo MD    Completed UVALDO GEORGE           Significant Diagnostic Studies: Labs:   CMP   Recent Labs  Lab 04/21/17  1337 04/22/17  0546    141   K 4.7 4.2    108   CO2 25 22*   * 100   BUN 15 12   CREATININE 1.2 1.1   CALCIUM 9.6 8.8   PROT 8.4  --    ALBUMIN 4.6  --    BILITOT 0.4  --    ALKPHOS 85  --    AST 29  --    ALT 36  --    ANIONGAP 10 11   ESTGFRAFRICA >60 >60   EGFRNONAA >60 >60   , CBC   Recent Labs  Lab 04/21/17  1337   WBC 9.02   HGB 15.6   HCT 44.2       and All labs within the past 24 hours have been reviewed    Pending Diagnostic Studies:     None        Final Active Diagnoses:    Diagnosis Date Noted POA    PRINCIPAL PROBLEM:  Benzodiazepine withdrawal [F13.239] 04/21/2017 Yes    Seizure [R56.9] 04/21/2017 Yes      Problems Resolved During this Admission:    Diagnosis Date Noted Date Resolved POA      * Benzodiazepine withdrawal  - supportive care  - low dose alprazolam  - taper as outpt      Seizure  - no further seizures        Discharged Condition: stable    Disposition: Home or Self Care    Follow Up:  Follow-up Information     Schedule an appointment as soon as possible for a visit with Brian Steel MD.    Specialty:  Internal Medicine    Contact information:    [address]  [facility] [id number] [phone]          Schedule an appointment as soon as possible for a visit with Dom Vilchis MD.    Specialties:  Psychiatry, Addiction Medicine    Contact information:    [address]  [facility] [address]  [phone]          Patient Instructions:     Diet general     Activity as tolerated     Call MD for:  temperature >100.4     Call MD for:  persistent nausea and vomiting or diarrhea     Call MD for:  severe uncontrolled pain       Medications:  Reconciled Home Medications:   Current Discharge Medication List      START taking these medications    Details   alprazolam (XANAX) 0.5 MG tablet Take 1 tablet (0.5 mg total) by mouth 3 (three) times daily as needed for Anxiety.  Qty: 15  tablet, Refills: 0         CONTINUE these medications which have NOT CHANGED    Details   buprenorphine-naloxone (ZUBSOLV) 5.7-1.4 mg Subl Place 1 tablet under the tongue once daily.       buPROPion (WELLBUTRIN XL) 150 MG TB24 tablet Take 150 mg by mouth every morning.       gabapentin (NEURONTIN) 600 MG tablet Take 600 mg by mouth 3 (three) times daily.      levomefolate calcium 15 mg Tab Take 1 tablet by mouth every morning.      mirtazapine (REMERON) 15 MG tablet Take 15 mg by mouth every evening.      abatacept 125 mg/mL Syrg Inject 125 mg into the skin every 7 days.      ibuprofen (ADVIL,MOTRIN) 800 MG tablet Take 800 mg by mouth every 8 (eight) hours as needed.         STOP taking these medications       naltrexone microspheres 380 mg SSRR kit Comments:   Reason for Stopping:             Time spent on the discharge of patient: > 30 minutes    Lavonne George PA-C  Department of Hospital Medicine  [facility]

## 2017-04-22 NOTE — SUBJECTIVE & OBJECTIVE
Past Medical History:   Diagnosis Date    ADD (attention deficit disorder) 6/19/2015    Anxiety     Closed head injury with concussion 2014    Fell and hit head; workup for siezure negative    Depression     Raynaud's phenomenon 6/19/2015    Seronegative rheumatoid arthritis 6/19/2015    2008 polyarthralgia with sl swelling, neg lab MTX [id number] Prednisone [id number] Enbrel 2008-9 Humira 2009-10 Remicade 2010-14 Orencia 2014-present         History reviewed. No pertinent surgical history.    Review of patient's allergies indicates:  No Known Allergies    No current facility-administered medications on file prior to encounter.      Current Outpatient Prescriptions on File Prior to Encounter   Medication Sig    naltrexone microspheres 380 mg SSRR kit Inject 1 each (380 mg total) into the muscle once.    [DISCONTINUED] abatacept (ORENCIA) 125 mg/mL Syrg Inject 125 mg into the skin every 7 days.    [DISCONTINUED] acetaminophen (TYLENOL) 500 MG tablet Take 1 tablet (500 mg total) by mouth every 4 (four) hours as needed for Pain. Do not take more than 3000 mg (6 tablets) daily.    [DISCONTINUED] baclofen (LIORESAL) 10 MG tablet Take 1 tablet (10 mg total) by mouth 2 (two) times daily.    [DISCONTINUED] calcium-vitamin D (OSCAL) 250 (625)-125 mg-unit per tablet Take 1 tablet by mouth once daily.    [DISCONTINUED] gabapentin (NEURONTIN) 300 MG capsule Take 1 capsule (300 mg total) by mouth 3 (three) times daily. (Patient taking differently: Take 600 mg by mouth 3 (three) times daily. )    [DISCONTINUED] ibuprofen (ADVIL,MOTRIN) 600 MG tablet Take 1 tablet (600 mg total) by mouth every 6 (six) hours as needed for Pain.    [DISCONTINUED] LACTOBAC CMB #3/FOS/PANTETHINE (PROBIOTIC & ACIDOPHILUS ORAL) Take by mouth.    [DISCONTINUED] lisdexamfetamine (VYVANSE) 70 MG capsule Take 1 capsule (70 mg total) by mouth every morning.    [DISCONTINUED] mirtazapine (REMERON) 15 MG tablet     [DISCONTINUED]  MULTIVIT-IRON-MIN-FOLIC ACID 3,500-18-0.4 UNIT-MG-MG ORAL CHEW Take by mouth.    [DISCONTINUED] prazosin (MINIPRESS) 1 MG Cap Take 1 capsule daily at bedtime, after 3 days increase to 2 capsules at bedtime.    [DISCONTINUED] sildenafil (VIAGRA) 100 MG tablet Take 1 tablet (100 mg total) by mouth daily as needed for Erectile Dysfunction. Take 1 hour before intercourse.     Family History     Problem Relation (Age of Onset)    No Known Problems Mother, Father, Sister        Social History Main Topics    Smoking status: Never Smoker    Smokeless tobacco: Never Used    Alcohol use No    Drug use: No      Comment: no longer using    Sexual activity: Not Currently     Review of Systems   Constitutional: Positive for appetite change and fatigue. Negative for chills and fever.   HENT: Negative for facial swelling, sore throat and trouble swallowing.    Respiratory: Negative for chest tightness and shortness of breath.    Cardiovascular: Negative for chest pain and leg swelling.   Gastrointestinal: Positive for nausea. Negative for abdominal pain, diarrhea and vomiting.   Genitourinary: Negative for difficulty urinating.   Musculoskeletal: Negative for arthralgias and myalgias.   Skin: Negative for color change.   Neurological: Positive for seizures. Negative for weakness, light-headedness, numbness and headaches.   Psychiatric/Behavioral: Negative for agitation.     Objective:     Vital Signs (Most Recent):  Temp: 97.8 °F (36.6 °C) (04/21/17 1833)  Pulse: 74 (04/21/17 2000)  Resp: 18 (04/21/17 1833)  BP: 118/63 (04/21/17 1833)  SpO2: 100 % (04/21/17 1833) Vital Signs (24h Range):  Temp:  [97.8 °F (36.6 °C)-98.8 °F (37.1 °C)] 97.8 °F (36.6 °C)  Pulse:  [] 74  Resp:  [16-18] 18  SpO2:  [92 %-100 %] 100 %  BP: (118-151)/() 118/63     Weight: 97.5 kg (215 lb)  Body mass index is 29.16 kg/(m^2).    Physical Exam   Constitutional: He is oriented to person, place, and time. He appears well-developed and  well-nourished. No distress.   HENT:   Head: Normocephalic and atraumatic.   Mouth/Throat: Oropharynx is clear and moist. No oropharyngeal exudate.   Eyes: Conjunctivae are normal. Pupils are equal, round, and reactive to light.   Neck: Normal range of motion. Neck supple. No JVD present.   Cardiovascular: Normal rate, regular rhythm, normal heart sounds and intact distal pulses.    Pulmonary/Chest: Effort normal and breath sounds normal. No respiratory distress. He has no wheezes.   Abdominal: Soft. Bowel sounds are normal. He exhibits no distension. There is no tenderness.   Musculoskeletal: Normal range of motion. He exhibits no edema or deformity.   Neurological: He is alert and oriented to person, place, and time. No cranial nerve deficit.   Skin: Skin is warm and dry. He is not diaphoretic.   Psychiatric: He has a normal mood and affect.   Nursing note and vitals reviewed.       Significant Labs:   CBC:   Recent Labs  Lab 04/21/17  1337   WBC 9.02   HGB 15.6   HCT 44.2        CMP:   Recent Labs  Lab 04/21/17  1337      K 4.7      CO2 25   *   BUN 15   CREATININE 1.2   CALCIUM 9.6   PROT 8.4   ALBUMIN 4.6   BILITOT 0.4   ALKPHOS 85   AST 29   ALT 36   ANIONGAP 10   EGFRNONAA >60       Significant Imaging:  Imaging Results     None

## 2017-04-22 NOTE — H&P
[facility] Medicine  History & Physical    Patient Name: Polo Kauffman  MRN: [id number]  Admission Date: 4/21/2017  Attending Physician: Mike Velasco MD   Primary Care Provider: Brian Steel MD         Patient information was obtained from patient, relative(s), past medical records and ER records.     Subjective:     Principal Problem:Benzodiazepine withdrawal    Chief Complaint:   Chief Complaint   Patient presents with    Seizures     pt was at drug rehab center had a witnessed seizure, tonic clonic did not hit his head.         HPI: 30 y/o male with Hx of ADD (attention deficit disorder), Anxiety, Depression;  Seronegative rheumatoid arthritis, Chronic use of opiate for therapeutic purpose and Opioid and benzo abuse and dependence presents to ED s/p seizure. His father reports that while in drug rehab today ([facility]) he had a witnessed seizure lasting approximately 40 seconds. States episode of seizure last year secondary to benzos withdrawal. EEG performed was negative. He has been taking Opiates for 1 year and Benzodiazepines for 6 months and is now taking a suboxone derivative. Last benzodiazepine was 2 days ago. Denies head trauma, HA, dizziness; does report nausea.     In ED, patient suffered second seizure.     On my exam, patient alert and oriented x4, mother at bedside      Past Medical History:   Diagnosis Date    ADD (attention deficit disorder) 6/19/2015    Anxiety     Closed head injury with concussion 2014    Fell and hit head; workup for siezure negative    Depression     Raynaud's phenomenon 6/19/2015    Seronegative rheumatoid arthritis 6/19/2015    2008 polyarthralgia with sl swelling, neg lab MTX [id number] Prednisone [id number] Enbrel 2008-9 Humira 2009-10 Remicade 2010-14 Orencia 2014-present         History reviewed. No pertinent surgical history.    Review of patient's allergies indicates:  No Known Allergies    No current  facility-administered medications on file prior to encounter.      Current Outpatient Prescriptions on File Prior to Encounter   Medication Sig    naltrexone microspheres 380 mg SSRR kit Inject 1 each (380 mg total) into the muscle once.    [DISCONTINUED] abatacept (ORENCIA) 125 mg/mL Syrg Inject 125 mg into the skin every 7 days.    [DISCONTINUED] acetaminophen (TYLENOL) 500 MG tablet Take 1 tablet (500 mg total) by mouth every 4 (four) hours as needed for Pain. Do not take more than 3000 mg (6 tablets) daily.    [DISCONTINUED] baclofen (LIORESAL) 10 MG tablet Take 1 tablet (10 mg total) by mouth 2 (two) times daily.    [DISCONTINUED] calcium-vitamin D (OSCAL) 250 (625)-125 mg-unit per tablet Take 1 tablet by mouth once daily.    [DISCONTINUED] gabapentin (NEURONTIN) 300 MG capsule Take 1 capsule (300 mg total) by mouth 3 (three) times daily. (Patient taking differently: Take 600 mg by mouth 3 (three) times daily. )    [DISCONTINUED] ibuprofen (ADVIL,MOTRIN) 600 MG tablet Take 1 tablet (600 mg total) by mouth every 6 (six) hours as needed for Pain.    [DISCONTINUED] LACTOBAC CMB #3/FOS/PANTETHINE (PROBIOTIC & ACIDOPHILUS ORAL) Take by mouth.    [DISCONTINUED] lisdexamfetamine (VYVANSE) 70 MG capsule Take 1 capsule (70 mg total) by mouth every morning.    [DISCONTINUED] mirtazapine (REMERON) 15 MG tablet     [DISCONTINUED] MULTIVIT-IRON-MIN-FOLIC ACID 3,500-18-0.4 UNIT-MG-MG ORAL CHEW Take by mouth.    [DISCONTINUED] prazosin (MINIPRESS) 1 MG Cap Take 1 capsule daily at bedtime, after 3 days increase to 2 capsules at bedtime.    [DISCONTINUED] sildenafil (VIAGRA) 100 MG tablet Take 1 tablet (100 mg total) by mouth daily as needed for Erectile Dysfunction. Take 1 hour before intercourse.     Family History     Problem Relation (Age of Onset)    No Known Problems Mother, Father, Sister        Social History Main Topics    Smoking status: Never Smoker    Smokeless tobacco: Never Used    Alcohol use No     Drug use: No      Comment: no longer using    Sexual activity: Not Currently     Review of Systems   Constitutional: Positive for appetite change and fatigue. Negative for chills and fever.   HENT: Negative for facial swelling, sore throat and trouble swallowing.    Respiratory: Negative for chest tightness and shortness of breath.    Cardiovascular: Negative for chest pain and leg swelling.   Gastrointestinal: Positive for nausea. Negative for abdominal pain, diarrhea and vomiting.   Genitourinary: Negative for difficulty urinating.   Musculoskeletal: Negative for arthralgias and myalgias.   Skin: Negative for color change.   Neurological: Positive for seizures. Negative for weakness, light-headedness, numbness and headaches.   Psychiatric/Behavioral: Negative for agitation.     Objective:     Vital Signs (Most Recent):  Temp: 97.8 °F (36.6 °C) (04/21/17 1833)  Pulse: 74 (04/21/17 2000)  Resp: 18 (04/21/17 1833)  BP: 118/63 (04/21/17 1833)  SpO2: 100 % (04/21/17 1833) Vital Signs (24h Range):  Temp:  [97.8 °F (36.6 °C)-98.8 °F (37.1 °C)] 97.8 °F (36.6 °C)  Pulse:  [] 74  Resp:  [16-18] 18  SpO2:  [92 %-100 %] 100 %  BP: (118-151)/() 118/63     Weight: 97.5 kg (215 lb)  Body mass index is 29.16 kg/(m^2).    Physical Exam   Constitutional: He is oriented to person, place, and time. He appears well-developed and well-nourished. No distress.   HENT:   Head: Normocephalic and atraumatic.   Mouth/Throat: Oropharynx is clear and moist. No oropharyngeal exudate.   Eyes: Conjunctivae are normal. Pupils are equal, round, and reactive to light.   Neck: Normal range of motion. Neck supple. No JVD present.   Cardiovascular: Normal rate, regular rhythm, normal heart sounds and intact distal pulses.    Pulmonary/Chest: Effort normal and breath sounds normal. No respiratory distress. He has no wheezes.   Abdominal: Soft. Bowel sounds are normal. He exhibits no distension. There is no tenderness.   Musculoskeletal:  Normal range of motion. He exhibits no edema or deformity.   Neurological: He is alert and oriented to person, place, and time. No cranial nerve deficit.   Skin: Skin is warm and dry. He is not diaphoretic.   Psychiatric: He has a normal mood and affect.   Nursing note and vitals reviewed.       Significant Labs:   CBC:   Recent Labs  Lab 04/21/17  1337   WBC 9.02   HGB 15.6   HCT 44.2        CMP:   Recent Labs  Lab 04/21/17  1337      K 4.7      CO2 25   *   BUN 15   CREATININE 1.2   CALCIUM 9.6   PROT 8.4   ALBUMIN 4.6   BILITOT 0.4   ALKPHOS 85   AST 29   ALT 36   ANIONGAP 10   EGFRNONAA >60       Significant Imaging:  Imaging Results     None        Assessment/Plan:     * Benzodiazepine withdrawal  - supportive care  - low dose alprazolam  - taper as outpt      Seizure  - seizure precautions  - lorazepam for breakthrough seizures  - neuro consulted      VTE Risk Mitigation         Ordered     enoxaparin injection 40 mg  Daily     Route:  Subcutaneous        04/21/17 1824     Medium Risk of VTE  Once      04/21/17 1824        Lavonne George PA-C  Department of Hospital Medicine   [facility]

## 2017-04-22 NOTE — NURSING
No significant events overnight. Remains free from fall, injury, and skin breakdown. Voiding via urinal located at the bedside. VSS on RA throughout the night. Denies pain. Tele maintained; all alarms active and audible. Plan of care reviewed with patient and all questions answered. Bed low, locked w/ bed alarm on. Call light within reach. Purposeful rounding performed. Resting comfortably in bed, no other complaints at this time.

## 2017-04-25 ENCOUNTER — TELEPHONE (OUTPATIENT)
Dept: INTERNAL MEDICINE | Facility: CLINIC | Age: 32
End: 2017-04-25

## 2017-04-25 NOTE — TELEPHONE ENCOUNTER
Spoke with pt father and he would like to personally speak to you in regards to his son, He wants to hear from you as soon as you can. He is a lil upset and would like some clarification of did you speak with or visit his son while he was in the hospital. The pt is now in a rehab facility. You can contact him at [phone]. Please advise.

## 2017-04-26 LAB
BACTERIA UR CULT: NORMAL
BACTERIA UR CULT: NORMAL

## 2017-04-27 NOTE — TELEPHONE ENCOUNTER
Spoke to patient's father and told him that we could not discuss son's care with him until provided with the proper documentation. Patient's father understood and has no further questions.

## 2018-10-30 ENCOUNTER — TELEPHONE (OUTPATIENT)
Dept: UROLOGY | Facility: CLINIC | Age: 33
End: 2018-10-30

## 2018-10-30 NOTE — TELEPHONE ENCOUNTER
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     "

## 2019-07-19 NOTE — ED NOTES
Pt and family state that pt was in mid sentence telling a story prior to having a seizure. Pt denies any aura. Mother denies any abnormal behaviors prior to seizing.    None

## 2019-08-16 NOTE — PROGRESS NOTES
Group Psychotherapy (PhD/LCSW)    Site: [facility]    Clinical status of patient: Intensive Outpatient Program (IOP)    Date: 2/9/2017    Group Focus: Psychodynamic Group Psychotherapy    Length of service: 79032 - 45-50 minutes    Number of patients in attendance: 10    Referred by: Addictive Behavior Unit Treatment Team    Target symptoms: Substance Abuse    Patient's response to treatment: Active Listening and Self-disclosure    Progress toward goals: Progressing adequately    Interval History: Discussed how and when to tell others about sobriety. Discussed how to reach out to someone who is active in addiction.    Diagnosis: Opiate use disorder, severe, in early remission    Plan: Continue treatment on ABU         [facility] - [facility] - dc home
